# Patient Record
Sex: FEMALE | Race: WHITE | NOT HISPANIC OR LATINO | URBAN - METROPOLITAN AREA
[De-identification: names, ages, dates, MRNs, and addresses within clinical notes are randomized per-mention and may not be internally consistent; named-entity substitution may affect disease eponyms.]

---

## 2017-01-05 ENCOUNTER — INPATIENT (INPATIENT)
Facility: HOSPITAL | Age: 82
LOS: 5 days | Discharge: NURSING, REHAB OR LONG-TERM CARE | End: 2017-01-11
Attending: INTERNAL MEDICINE | Admitting: INTERNAL MEDICINE
Payer: MEDICARE

## 2017-01-05 DIAGNOSIS — I50.9 HEART FAILURE, UNSPECIFIED: ICD-10-CM

## 2017-01-05 DIAGNOSIS — J18.9 PNEUMONIA, UNSPECIFIED ORGANISM: ICD-10-CM

## 2017-01-05 PROCEDURE — 71010: CPT | Mod: 26

## 2017-01-05 PROCEDURE — 93306 TTE W/DOPPLER COMPLETE: CPT | Mod: 26

## 2017-01-05 PROCEDURE — 99285 EMERGENCY DEPT VISIT HI MDM: CPT

## 2017-01-05 PROCEDURE — 93010 ELECTROCARDIOGRAM REPORT: CPT

## 2017-01-06 PROBLEM — Z00.00 ENCOUNTER FOR PREVENTIVE HEALTH EXAMINATION: Status: ACTIVE | Noted: 2017-01-06

## 2017-01-06 PROCEDURE — 99223 1ST HOSP IP/OBS HIGH 75: CPT

## 2017-01-06 PROCEDURE — 93010 ELECTROCARDIOGRAM REPORT: CPT

## 2017-01-07 PROCEDURE — 93010 ELECTROCARDIOGRAM REPORT: CPT

## 2017-01-07 PROCEDURE — 99233 SBSQ HOSP IP/OBS HIGH 50: CPT

## 2017-01-08 PROCEDURE — 99232 SBSQ HOSP IP/OBS MODERATE 35: CPT

## 2017-01-08 PROCEDURE — 93010 ELECTROCARDIOGRAM REPORT: CPT

## 2017-01-09 PROCEDURE — 99232 SBSQ HOSP IP/OBS MODERATE 35: CPT

## 2017-01-09 PROCEDURE — 93308 TTE F-UP OR LMTD: CPT | Mod: 26

## 2017-01-10 PROCEDURE — 99232 SBSQ HOSP IP/OBS MODERATE 35: CPT

## 2017-01-11 PROCEDURE — 93306 TTE W/DOPPLER COMPLETE: CPT | Mod: 26

## 2017-01-17 DIAGNOSIS — M19.90 UNSPECIFIED OSTEOARTHRITIS, UNSPECIFIED SITE: ICD-10-CM

## 2017-01-17 DIAGNOSIS — I50.31 ACUTE DIASTOLIC (CONGESTIVE) HEART FAILURE: ICD-10-CM

## 2017-01-17 DIAGNOSIS — I47.1 SUPRAVENTRICULAR TACHYCARDIA: ICD-10-CM

## 2017-01-17 DIAGNOSIS — J12.9 VIRAL PNEUMONIA, UNSPECIFIED: ICD-10-CM

## 2017-01-17 DIAGNOSIS — Z85.3 PERSONAL HISTORY OF MALIGNANT NEOPLASM OF BREAST: ICD-10-CM

## 2017-01-17 DIAGNOSIS — Z72.0 TOBACCO USE: ICD-10-CM

## 2017-01-17 DIAGNOSIS — I11.0 HYPERTENSIVE HEART DISEASE WITH HEART FAILURE: ICD-10-CM

## 2017-01-17 DIAGNOSIS — B97.4 RESPIRATORY SYNCYTIAL VIRUS AS THE CAUSE OF DISEASES CLASSIFIED ELSEWHERE: ICD-10-CM

## 2017-01-17 DIAGNOSIS — I21.4 NON-ST ELEVATION (NSTEMI) MYOCARDIAL INFARCTION: ICD-10-CM

## 2017-01-19 DIAGNOSIS — I24.8 OTHER FORMS OF ACUTE ISCHEMIC HEART DISEASE: ICD-10-CM

## 2017-03-21 ENCOUNTER — INPATIENT (INPATIENT)
Facility: HOSPITAL | Age: 82
LOS: 5 days | Discharge: HOME HEALTH | End: 2017-03-27
Attending: FAMILY MEDICINE | Admitting: INTERNAL MEDICINE
Payer: MEDICARE

## 2017-03-21 VITALS — WEIGHT: 106.92 LBS

## 2017-03-21 LAB
ALBUMIN SERPL ELPH-MCNC: 3.1 G/DL — LOW (ref 3.3–5)
ALP SERPL-CCNC: 90 U/L — SIGNIFICANT CHANGE UP (ref 40–120)
ALT FLD-CCNC: 13 U/L — SIGNIFICANT CHANGE UP (ref 12–78)
ANION GAP SERPL CALC-SCNC: 9 MMOL/L — SIGNIFICANT CHANGE UP (ref 5–17)
APTT BLD: 31.5 SEC — SIGNIFICANT CHANGE UP (ref 27.5–37.4)
AST SERPL-CCNC: 33 U/L — SIGNIFICANT CHANGE UP (ref 15–37)
BASOPHILS # BLD AUTO: 0.1 K/UL — SIGNIFICANT CHANGE UP (ref 0–0.2)
BASOPHILS NFR BLD AUTO: 0.6 % — SIGNIFICANT CHANGE UP (ref 0–2)
BILIRUB SERPL-MCNC: 0.4 MG/DL — SIGNIFICANT CHANGE UP (ref 0.2–1.2)
BUN SERPL-MCNC: 37 MG/DL — HIGH (ref 7–23)
CALCIUM SERPL-MCNC: 9 MG/DL — SIGNIFICANT CHANGE UP (ref 8.5–10.1)
CHLORIDE SERPL-SCNC: 109 MMOL/L — HIGH (ref 96–108)
CK SERPL-CCNC: 76 U/L — SIGNIFICANT CHANGE UP (ref 26–192)
CO2 SERPL-SCNC: 26 MMOL/L — SIGNIFICANT CHANGE UP (ref 22–31)
CREAT SERPL-MCNC: 1.27 MG/DL — SIGNIFICANT CHANGE UP (ref 0.5–1.3)
D DIMER BLD IA.RAPID-MCNC: 716 NG/ML DDU — HIGH
EOSINOPHIL # BLD AUTO: 0.1 K/UL — SIGNIFICANT CHANGE UP (ref 0–0.5)
EOSINOPHIL NFR BLD AUTO: 1.2 % — SIGNIFICANT CHANGE UP (ref 0–6)
GLUCOSE SERPL-MCNC: 75 MG/DL — SIGNIFICANT CHANGE UP (ref 70–99)
HCT VFR BLD CALC: 37.9 % — SIGNIFICANT CHANGE UP (ref 34.5–45)
HGB BLD-MCNC: 11.6 G/DL — SIGNIFICANT CHANGE UP (ref 11.5–15.5)
INR BLD: 0.94 RATIO — SIGNIFICANT CHANGE UP (ref 0.88–1.16)
LYMPHOCYTES # BLD AUTO: 1.5 K/UL — SIGNIFICANT CHANGE UP (ref 1–3.3)
LYMPHOCYTES # BLD AUTO: 12.5 % — LOW (ref 13–44)
MCHC RBC-ENTMCNC: 24.1 PG — LOW (ref 27–34)
MCHC RBC-ENTMCNC: 30.7 GM/DL — LOW (ref 32–36)
MCV RBC AUTO: 78.4 FL — LOW (ref 80–100)
MONOCYTES # BLD AUTO: 0.9 K/UL — SIGNIFICANT CHANGE UP (ref 0–0.9)
MONOCYTES NFR BLD AUTO: 7.4 % — SIGNIFICANT CHANGE UP (ref 2–14)
NEUTROPHILS # BLD AUTO: 9.5 K/UL — HIGH (ref 1.8–7.4)
NEUTROPHILS NFR BLD AUTO: 78.3 % — HIGH (ref 43–77)
PLATELET # BLD AUTO: 464 K/UL — HIGH (ref 150–400)
POTASSIUM SERPL-MCNC: 5.1 MMOL/L — SIGNIFICANT CHANGE UP (ref 3.5–5.3)
POTASSIUM SERPL-SCNC: 5.1 MMOL/L — SIGNIFICANT CHANGE UP (ref 3.5–5.3)
PROT SERPL-MCNC: 7.5 GM/DL — SIGNIFICANT CHANGE UP (ref 6–8.3)
PROTHROM AB SERPL-ACNC: 10.1 SEC — SIGNIFICANT CHANGE UP (ref 9.8–12.7)
RBC # BLD: 4.83 M/UL — SIGNIFICANT CHANGE UP (ref 3.8–5.2)
RBC # FLD: 16.9 % — HIGH (ref 10.3–14.5)
SODIUM SERPL-SCNC: 144 MMOL/L — SIGNIFICANT CHANGE UP (ref 135–145)
TROPONIN I SERPL-MCNC: 0.04 NG/ML — SIGNIFICANT CHANGE UP (ref 0.01–0.04)
TROPONIN I SERPL-MCNC: 0.05 NG/ML — HIGH (ref 0.01–0.04)
WBC # BLD: 12.2 K/UL — HIGH (ref 3.8–10.5)
WBC # FLD AUTO: 12.2 K/UL — HIGH (ref 3.8–10.5)

## 2017-03-21 PROCEDURE — 99291 CRITICAL CARE FIRST HOUR: CPT

## 2017-03-21 PROCEDURE — 93010 ELECTROCARDIOGRAM REPORT: CPT

## 2017-03-21 PROCEDURE — 71010: CPT | Mod: 26

## 2017-03-21 RX ORDER — NITROGLYCERIN 6.5 MG
1 CAPSULE, EXTENDED RELEASE ORAL ONCE
Qty: 0 | Refills: 0 | Status: COMPLETED | OUTPATIENT
Start: 2017-03-21 | End: 2017-03-21

## 2017-03-21 RX ORDER — FUROSEMIDE 40 MG
60 TABLET ORAL ONCE
Qty: 0 | Refills: 0 | Status: COMPLETED | OUTPATIENT
Start: 2017-03-21 | End: 2017-03-21

## 2017-03-21 RX ORDER — ASPIRIN/CALCIUM CARB/MAGNESIUM 324 MG
81 TABLET ORAL DAILY
Qty: 0 | Refills: 0 | Status: DISCONTINUED | OUTPATIENT
Start: 2017-03-21 | End: 2017-03-27

## 2017-03-21 RX ORDER — SODIUM CHLORIDE 9 MG/ML
3 INJECTION INTRAMUSCULAR; INTRAVENOUS; SUBCUTANEOUS ONCE
Qty: 0 | Refills: 0 | Status: COMPLETED | OUTPATIENT
Start: 2017-03-21 | End: 2017-03-21

## 2017-03-21 RX ADMIN — SODIUM CHLORIDE 3 MILLILITER(S): 9 INJECTION INTRAMUSCULAR; INTRAVENOUS; SUBCUTANEOUS at 19:24

## 2017-03-21 RX ADMIN — Medication 81 MILLIGRAM(S): at 20:50

## 2017-03-21 RX ADMIN — Medication 1 INCH(S): at 19:24

## 2017-03-21 RX ADMIN — Medication 60 MILLIGRAM(S): at 19:24

## 2017-03-21 NOTE — ED ADULT NURSE NOTE - ED STAT RN HANDOFF DETAILS
Recvd care of pt at this time from RN Chloe VAIL, pt alert, confused, as per daughter pt is at baseline mental status, pt on NRB, RR 20, pt on cardiac monitor, states relief in breathing after medication and placement on NRB, bed rails up, call bell within reach, daughter at bedside, will continue to monitor.

## 2017-03-21 NOTE — ED ADULT NURSE NOTE - OBJECTIVE STATEMENT
Pt is a 89 y/o female, A & O x 3, presents to ED w/ SOB w/ excertion , began earlier today, pt also c/o chest pain, but has since resolved at this time, pt placed on Venti mask, sp02 was in 80's, improved to 99%, pt denies nausea, vomiting or diarrhea, pt on cardiac monitor, EKG obtained, IV lock placed, bed rails up, will continue to monitor.

## 2017-03-21 NOTE — H&P ADULT - ASSESSMENT
89 yo F with PMH significant for HTN, Systolic CHF resident of [facility] sent to the ED for eval. of SOB. As per patient, this morning she woke up with worsening of SOB.    # Dyspnea: 2/2 to acute on chronic systolic CHF, r/o ACS, r/o PE  - Admitted to telemetry  - Serial CE  - serial EKG  - Pt received IV Lasix 60 mg x 1 in the ED and started ot feel better  - c/w Lasix  - c/w Aspirin  - Monitro vitals    # 89 yo F with PMH significant for HTN, Systolic CHF resident of [facility] sent to the ED for eval. of SOB. As per patient, this morning she woke up with worsening of SOB.

## 2017-03-21 NOTE — ED PROVIDER NOTE - ENMT, MLM
Airway patent, Nasal mucosa clear. Mouth with normal mucosa. Throat has no vesicles, no oropharyngeal exudates and uvula is midline. + JVD.

## 2017-03-21 NOTE — H&P ADULT - HISTORY OF PRESENT ILLNESS
89 yo F with PMH significant for HTN, Systolic CHF resident of [facility] sent to the ED for eval. of SOB. As per patient, this morning she woke up with worsening of SOB. Pt states that for the past few days she feels exertional SOB that is progressively gotten worse today. Also c/o chest pain. Pt states that this morning she also noticed change in her speech describes as slurred speech. Denies tingling or numbness. Denies acute wekaness on extremeties . Denies cough or palpitations. Denies abdominal pain, N/V/D/C. Denies fever or chills.    On arrival to the ED,  pt was desating to 70-80 % on RA, was placed on Ventimask and now gradually weaned on nasal canula, sating 100 % on 3 LNC.     In the ED:  D DImer: 716  Troponin : 0.052 >> o.036 89 yo F with PMH significant for HTN, Systolic CHF resident of [facility] sent to the ED for eval. of SOB. As per patient, this morning she woke up with worsening of SOB. Pt states that for the past few days she feels exertional SOB that is progressively gotten worse today. Also c/o chest pain. Pt states that this morning she also noticed change in her speech describes as slurred speech. Denies tingling or numbness. Denies acute weakness on extremeties . Denies cough or palpitations. Denies abdominal pain, N/V/D/C. Denies fever or chills. Denies chest pain at present.     On arrival to the ED,  pt was desating to 70-80 % on RA, was placed on Ventimask and now gradually weaned on nasal canula, sating 100 % on 3 LNC.     In the ED:  D DImer: 716  Troponin : 0.052 >> o.036  CXR: b/l [pleural effusion, waiting for official reading

## 2017-03-21 NOTE — ED PROVIDER NOTE - OBJECTIVE STATEMENT
89 y/o female with hx of Pericardial effusion, CHF presents to the ED for shortness of breath with onset in the past few hours since this morning after waking up. Fell one week ago and had knee and nose injury. Denies fever, cough, urinary sx, abd pain, PMD is Dr. Steen. 89 y/o female with hx of Pericardial effusion, CHF presents to the ED for shortness of breath with onset in the past few hours since this morning after waking up. C/o shortness of breath.  Fell one week ago and had knee and nose injury. Denies fever, cough, urinary sx, abd pain, chest pain. PMD is Dr. Steen.

## 2017-03-21 NOTE — ED ADULT NURSE REASSESSMENT NOTE - CONDITION
improved/At 20:30, pt taken off NRB, placed on Venti mask 50%, VSS, resp easy & non labored, will continue to monitor.

## 2017-03-21 NOTE — ED PROVIDER NOTE - CRITICAL CARE PROVIDED
interpretation of diagnostic studies/direct patient care (not related to procedure)/consult w/ pt's family directly relating to pts condition/documentation

## 2017-03-21 NOTE — ED PROVIDER NOTE - NS ED MD SCRIBE ATTENDING SCRIBE SECTIONS
PAST MEDICAL/SURGICAL/SOCIAL HISTORY/VITAL SIGNS( Pullset)/HISTORY OF PRESENT ILLNESS/REVIEW OF SYSTEMS/PROGRESS NOTE/PHYSICAL EXAM/RESULTS/DISPOSITION

## 2017-03-21 NOTE — H&P ADULT - ATTENDING COMMENTS
I have examined the patient and confirmed the essential components of the history, physical examination, diagnosis, and treatment plan. I agree with the patient's care as documented by the resident and amended herein by me. See resident's note for complete details of service

## 2017-03-21 NOTE — H&P ADULT - PROBLEM SELECTOR PLAN 1
(4) no impairment # Dyspnea: 2/2 to acute and chronic systolic CHF   Admitted to telemetry  - Serial CE  - serial EKG  - Pt received IV Lasix 60 mg x 1 in the ED and started ot feel better  - c/w Lasix  - c/w Aspirin  - Monitor vitals  - CTA chest PE to r/o PE    # # Dyspnea: 2/2 to acute and chronic systolic CHF   Admitted to telemetry  - Serial CE  - serial EKG  - Pt received IV Lasix 60 mg x 1 in the ED and started ot feel better  - c/w Lasix  - c/w Aspirin  - Monitor vitals  - CTA chest PE to r/o PE  -  Last Echo from 01/2017: LVEF 55 %, trace AR and MR, Moderate pericardial effusion.    # # Dyspnea: 2/2 to acute on chronic systolic CHF (HFrEF)   Admitted to telemetry  - Serial CE  - serial EKG  - Pt received IV Lasix 60 mg x 1 in the ED and started ot feel better  - c/w Lasix  - c/w Aspirin  - Monitor vitals  - Last Echo from 01/2017: LVEF 55 %, trace AR and MR, Moderate pericardial effusion.  - Dyspnea less likely due to PE --> CTA not indicated as patient's modified Well's is = 4.0.

## 2017-03-21 NOTE — H&P ADULT - NSHPPHYSICALEXAM_GEN_ALL_CORE
Vital Signs Last 24 Hrs  T(C): 36.4, Max: 36.4 (03-21 @ 19:25)  T(F): 97.5, Max: 97.6 (03-21 @ 19:25)  HR: 70 (69 - 76)  BP: 140/61 (140/61 - 190/90)  BP(mean): --  RR: 18 (16 - 19)  SpO2: 100% (98% - 100%)

## 2017-03-21 NOTE — ED PROVIDER NOTE - PMH
Congestive heart failure, unspecified congestive heart failure chronicity, unspecified congestive heart failure type

## 2017-03-21 NOTE — ED PROVIDER NOTE - PROGRESS NOTE DETAILS
Jaden Almanza, on behalf of Attending Dr. Gomez, Patient re-assessed and states she is feeling improved. Per daughter meds are ramipril 2.5mg daily, lasix 20 mg every other day [id number] meq/daily, mvi

## 2017-03-22 DIAGNOSIS — D72.829 ELEVATED WHITE BLOOD CELL COUNT, UNSPECIFIED: ICD-10-CM

## 2017-03-22 DIAGNOSIS — I10 ESSENTIAL (PRIMARY) HYPERTENSION: ICD-10-CM

## 2017-03-22 DIAGNOSIS — K59.00 CONSTIPATION, UNSPECIFIED: ICD-10-CM

## 2017-03-22 DIAGNOSIS — Z29.9 ENCOUNTER FOR PROPHYLACTIC MEASURES, UNSPECIFIED: ICD-10-CM

## 2017-03-22 DIAGNOSIS — I50.23 ACUTE ON CHRONIC SYSTOLIC (CONGESTIVE) HEART FAILURE: ICD-10-CM

## 2017-03-22 LAB
ANION GAP SERPL CALC-SCNC: 9 MMOL/L — SIGNIFICANT CHANGE UP (ref 5–17)
APTT BLD: 29.6 SEC — SIGNIFICANT CHANGE UP (ref 27.5–37.4)
BASOPHILS # BLD AUTO: 0 K/UL — SIGNIFICANT CHANGE UP (ref 0–0.2)
BASOPHILS NFR BLD AUTO: 0.6 % — SIGNIFICANT CHANGE UP (ref 0–2)
BUN SERPL-MCNC: 41 MG/DL — HIGH (ref 7–23)
CALCIUM SERPL-MCNC: 8.7 MG/DL — SIGNIFICANT CHANGE UP (ref 8.5–10.1)
CHLORIDE SERPL-SCNC: 109 MMOL/L — HIGH (ref 96–108)
CHOLEST SERPL-MCNC: 164 MG/DL — SIGNIFICANT CHANGE UP (ref 10–199)
CO2 SERPL-SCNC: 27 MMOL/L — SIGNIFICANT CHANGE UP (ref 22–31)
CREAT SERPL-MCNC: 1.21 MG/DL — SIGNIFICANT CHANGE UP (ref 0.5–1.3)
EOSINOPHIL # BLD AUTO: 0.2 K/UL — SIGNIFICANT CHANGE UP (ref 0–0.5)
EOSINOPHIL NFR BLD AUTO: 1.8 % — SIGNIFICANT CHANGE UP (ref 0–6)
GLUCOSE SERPL-MCNC: 84 MG/DL — SIGNIFICANT CHANGE UP (ref 70–99)
HCT VFR BLD CALC: 35.8 % — SIGNIFICANT CHANGE UP (ref 34.5–45)
HDLC SERPL-MCNC: 66 MG/DL — SIGNIFICANT CHANGE UP (ref 40–125)
HGB BLD-MCNC: 11.2 G/DL — LOW (ref 11.5–15.5)
INR BLD: 0.95 RATIO — SIGNIFICANT CHANGE UP (ref 0.88–1.16)
LACTATE SERPL-SCNC: 1 MMOL/L — SIGNIFICANT CHANGE UP (ref 0.7–2)
LIPID PNL WITH DIRECT LDL SERPL: 79 MG/DL — SIGNIFICANT CHANGE UP
LYMPHOCYTES # BLD AUTO: 1.4 K/UL — SIGNIFICANT CHANGE UP (ref 1–3.3)
LYMPHOCYTES # BLD AUTO: 16.8 % — SIGNIFICANT CHANGE UP (ref 13–44)
MAGNESIUM SERPL-MCNC: 2.3 MG/DL — SIGNIFICANT CHANGE UP (ref 1.8–2.4)
MCHC RBC-ENTMCNC: 24.5 PG — LOW (ref 27–34)
MCHC RBC-ENTMCNC: 31.2 GM/DL — LOW (ref 32–36)
MCV RBC AUTO: 78.6 FL — LOW (ref 80–100)
MONOCYTES # BLD AUTO: 0.8 K/UL — SIGNIFICANT CHANGE UP (ref 0–0.9)
MONOCYTES NFR BLD AUTO: 9.6 % — SIGNIFICANT CHANGE UP (ref 2–14)
NEUTROPHILS # BLD AUTO: 6 K/UL — SIGNIFICANT CHANGE UP (ref 1.8–7.4)
NEUTROPHILS NFR BLD AUTO: 71.3 % — SIGNIFICANT CHANGE UP (ref 43–77)
NT-PROBNP SERPL-SCNC: 3939 PG/ML — HIGH (ref 0–450)
PLATELET # BLD AUTO: 398 K/UL — SIGNIFICANT CHANGE UP (ref 150–400)
POTASSIUM SERPL-MCNC: 4.7 MMOL/L — SIGNIFICANT CHANGE UP (ref 3.5–5.3)
POTASSIUM SERPL-SCNC: 4.7 MMOL/L — SIGNIFICANT CHANGE UP (ref 3.5–5.3)
PROTHROM AB SERPL-ACNC: 10.2 SEC — SIGNIFICANT CHANGE UP (ref 9.8–12.7)
RBC # BLD: 4.56 M/UL — SIGNIFICANT CHANGE UP (ref 3.8–5.2)
RBC # FLD: 16.6 % — HIGH (ref 10.3–14.5)
SODIUM SERPL-SCNC: 145 MMOL/L — SIGNIFICANT CHANGE UP (ref 135–145)
TOTAL CHOLESTEROL/HDL RATIO MEASUREMENT: 2.5 RATIO — LOW (ref 3.3–7.1)
TRIGL SERPL-MCNC: 93 MG/DL — SIGNIFICANT CHANGE UP (ref 10–149)
TSH SERPL-MCNC: 1.92 UIU/ML — SIGNIFICANT CHANGE UP (ref 0.36–3.74)
WBC # BLD: 8.4 K/UL — SIGNIFICANT CHANGE UP (ref 3.8–10.5)
WBC # FLD AUTO: 8.4 K/UL — SIGNIFICANT CHANGE UP (ref 3.8–10.5)

## 2017-03-22 PROCEDURE — 99223 1ST HOSP IP/OBS HIGH 75: CPT

## 2017-03-22 PROCEDURE — 93010 ELECTROCARDIOGRAM REPORT: CPT

## 2017-03-22 RX ORDER — FUROSEMIDE 40 MG
1 TABLET ORAL
Qty: 0 | Refills: 0 | COMMUNITY

## 2017-03-22 RX ORDER — FUROSEMIDE 40 MG
40 TABLET ORAL DAILY
Qty: 0 | Refills: 0 | Status: DISCONTINUED | OUTPATIENT
Start: 2017-03-22 | End: 2017-03-23

## 2017-03-22 RX ORDER — LISINOPRIL 2.5 MG/1
40 TABLET ORAL DAILY
Qty: 0 | Refills: 0 | Status: DISCONTINUED | OUTPATIENT
Start: 2017-03-22 | End: 2017-03-27

## 2017-03-22 RX ORDER — FUROSEMIDE 40 MG
40 TABLET ORAL ONCE
Qty: 0 | Refills: 0 | Status: DISCONTINUED | OUTPATIENT
Start: 2017-03-22 | End: 2017-03-22

## 2017-03-22 RX ORDER — POTASSIUM CHLORIDE 20 MEQ
20 PACKET (EA) ORAL DAILY
Qty: 0 | Refills: 0 | Status: DISCONTINUED | OUTPATIENT
Start: 2017-03-22 | End: 2017-03-25

## 2017-03-22 RX ORDER — ACETAMINOPHEN 500 MG
650 TABLET ORAL EVERY 6 HOURS
Qty: 0 | Refills: 0 | Status: DISCONTINUED | OUTPATIENT
Start: 2017-03-22 | End: 2017-03-27

## 2017-03-22 RX ORDER — FUROSEMIDE 40 MG
40 TABLET ORAL DAILY
Qty: 0 | Refills: 0 | Status: DISCONTINUED | OUTPATIENT
Start: 2017-03-22 | End: 2017-03-22

## 2017-03-22 RX ORDER — DOCUSATE SODIUM 100 MG
100 CAPSULE ORAL THREE TIMES A DAY
Qty: 0 | Refills: 0 | Status: DISCONTINUED | OUTPATIENT
Start: 2017-03-22 | End: 2017-03-27

## 2017-03-22 RX ORDER — PANTOPRAZOLE SODIUM 20 MG/1
40 TABLET, DELAYED RELEASE ORAL
Qty: 0 | Refills: 0 | Status: DISCONTINUED | OUTPATIENT
Start: 2017-03-22 | End: 2017-03-27

## 2017-03-22 RX ORDER — METOPROLOL TARTRATE 50 MG
12.5 TABLET ORAL
Qty: 0 | Refills: 0 | Status: DISCONTINUED | OUTPATIENT
Start: 2017-03-22 | End: 2017-03-25

## 2017-03-22 RX ORDER — RAMIPRIL 5 MG
25 CAPSULE ORAL
Qty: 0 | Refills: 0 | COMMUNITY

## 2017-03-22 RX ORDER — POTASSIUM CHLORIDE 20 MEQ
1 PACKET (EA) ORAL
Qty: 0 | Refills: 0 | COMMUNITY

## 2017-03-22 RX ORDER — SENNA PLUS 8.6 MG/1
2 TABLET ORAL AT BEDTIME
Qty: 0 | Refills: 0 | Status: DISCONTINUED | OUTPATIENT
Start: 2017-03-22 | End: 2017-03-27

## 2017-03-22 RX ADMIN — Medication 1 INCH(S): at 06:44

## 2017-03-22 RX ADMIN — Medication 40 MILLIGRAM(S): at 01:58

## 2017-03-22 RX ADMIN — Medication 100 MILLIGRAM(S): at 06:58

## 2017-03-22 RX ADMIN — Medication 20 MILLIEQUIVALENT(S): at 11:48

## 2017-03-22 RX ADMIN — Medication 100 MILLIGRAM(S): at 22:40

## 2017-03-22 RX ADMIN — PANTOPRAZOLE SODIUM 40 MILLIGRAM(S): 20 TABLET, DELAYED RELEASE ORAL at 06:44

## 2017-03-22 RX ADMIN — LISINOPRIL 40 MILLIGRAM(S): 2.5 TABLET ORAL at 06:44

## 2017-03-22 RX ADMIN — Medication 81 MILLIGRAM(S): at 11:48

## 2017-03-22 RX ADMIN — Medication 12.5 MILLIGRAM(S): at 18:16

## 2017-03-22 NOTE — PHYSICAL THERAPY INITIAL EVALUATION ADULT - CRITERIA FOR SKILLED THERAPEUTIC INTERVENTIONS
rehab potential/risk reduction/prevention/anticipated discharge recommendation/anticipated equipment needs at discharge/functional limitations in following categories/therapy frequency/impairments found/predicted duration of therapy intervention

## 2017-03-22 NOTE — DIETITIAN INITIAL EVALUATION ADULT. - OTHER INFO
Consult for BMI<18. Pt reports wt has been stable (confirmed with documented wt h/x. Pt with mild muscle wasting.

## 2017-03-22 NOTE — CONSULT NOTE ADULT - ASSESSMENT
Essential hypertension: Essential hypertension  Acute on chronic systolic (congestive) heart failure: Acute on chronic systolic (congestive) heart failure    less sob today  monitor ProBNP/Electrlytes  agressive diuresis as ordered  continue ACE inhibitor  Low dose Beta blocker  strict i/o,daily weight  2D echo for EF  Serial CE/EKG    Acute on chronic systolic (congestive) heart failure: Acute on chronic systolic (congestive) heart failure Essential hypertension: Essential hypertension  Acute on chronic systolic (congestive) heart failure: Acute on chronic systolic (congestive) heart failure    less sob today  monitor ProBNP/Electrlytes  agressive diuresis as ordered  continue ACE inhibitor  Low dose Beta blocker  strict i/o,daily weight  2D echo for EF  Serial CE/EKG

## 2017-03-22 NOTE — DIETITIAN INITIAL EVALUATION ADULT. - ORAL INTAKE PTA
good/Pt reports having a good appetite, reports reduced PO during hospital stay when she feels sick.

## 2017-03-22 NOTE — PHYSICAL THERAPY INITIAL EVALUATION ADULT - PERTINENT HX OF CURRENT PROBLEM, REHAB EVAL
SOB, pt desat in ED. trop .052/.036. D-dimer elev-"dyspnea less likely PE-CTA not indicated" per MD note.

## 2017-03-22 NOTE — CONSULT NOTE ADULT - SUBJECTIVE AND OBJECTIVE BOX
PCP:    REQUESTING PHYSICIAN:    REASON FOR CONSULT:    CHIEF COMPLAINT:    HPI:  89 yo F with PMH significant for HTN, Systolic CHF resident of [facility] sent to the ED for eval of SOB.   As per patient, this morning she woke up with worsening of SOB.no Chest pain,mild chest tightness   Pt states that for the past few days she feels exertional SOB that is progressively gotten worse.  Pt states that this morning she also noticed change in her speech describes as slurred speech.   Denies tingling or numbness. Denies acute weakness. Denies cough or palpitations. Denies abdominal pain, N/V/D/C. Denies fever or chills. Denies chest pain at present.     On arrival to the ED,  pt was desating to 70-80 % on RA, was placed on Ventimask and now gradually weaned on nasal canula, sating 100 % on 3 LNC.     In the ED:  Troponin : 0.052 >> o.036  CXR: b/l [pleural effusion, waiting for official reading (21 Mar 2017 23:52)      PAST MEDICAL & SURGICAL HISTORY:  CHF (congestive heart failure)  Essential hypertension  Congestive heart failure, unspecified congestive heart failure chronicity, unspecified congestive heart failure type  No significant past surgical history      Allergies    ibuprofen (Unknown)  Originally Entered as [Unknown] reaction to [sulfur] (Unknown)  sulfa drugs (Unknown        SOCIAL HISTORY:  non smoker  no ETOH abuse    FAMILY HISTORY:  No pertinent family history in first degree relatives      MEDICATIONS:  MEDICATIONS  (STANDING):  aspirin  chewable 81milliGRAM(s) Oral daily  lisinopril 40milliGRAM(s) Oral daily  diltiazem   CD 180milliGRAM(s) Oral daily  potassium chloride    Tablet ER 20milliEquivalent(s) Oral daily  pantoprazole    Tablet 40milliGRAM(s) Oral before breakfast  docusate sodium 100milliGRAM(s) Oral three times a day  furosemide   Injectable 40milliGRAM(s) IV Push daily  furosemide   Injectable 40milliGRAM(s) IV Push once    MEDICATIONS  (PRN):  acetaminophen   Tablet 650milliGRAM(s) Oral every 6 hours PRN pain and fever  aluminum hydroxide/magnesium hydroxide/simethicone Suspension 30milliLiter(s) Oral every 4 hours PRN Dyspepsia  senna 2Tablet(s) Oral at bedtime PRN Constipation      REVIEW OF SYSTEMS:    CONSTITUTIONAL: No weakness, fevers or chills  EYES/ENT: No visual changes;  No vertigo or throat pain   NECK: No pain or stiffness  RESPIRATORY: No cough, wheezing, hemoptysis; No shortness of breath  CARDIOVASCULAR: No chest pain or palpitations  GASTROINTESTINAL: No abdominal or epigastric pain. No nausea, vomiting, or hematemesis; No diarrhea or constipation. No melena or hematochezia.  GENITOURINARY: No dysuria, frequency or hematuria  NEUROLOGICAL: No numbness or weakness  SKIN: No itching, burning, rashes, or lesions   All other review of systems is negative unless indicated above  Vital Signs Last 24 Hrs  T(C): 36.7, Max: 36.9 (03-22 @ 02:34)  T(F): 98, Max: 98.4 (03-22 @ 02:34)  HR: 77 (69 - 77)  BP: 166/73 (140/61 - 190/90)  BP(mean): --  RR: 16 (16 - 19)  SpO2: 99% (96% - 100%)      PHYSICAL EXAM:    Constitutional: NAD, awake and alert, well-developed  HEENT: PERR, EOMI,  No oral cyananosis.  Neck:  supple,  No JVD  Respiratory: Breath sounds are clear bilaterally, No wheezing, rales or rhonchi  Cardiovascular: S1 and S2, regular rate and rhythm, no Murmurs, gallops or rubs  Gastrointestinal: Bowel Sounds present, soft, nontender.   Extremities: No peripheral edema. No clubbing or cyanosis.  Vascular: 2+ peripheral pulses  Neurological: A/O x 3, no focal deficits  Musculoskeletal: no calf tenderness.  Skin: No rashes.      LABS: All Labs Reviewed:                        11.2   8.4   )-----------( 398      ( 22 Mar 2017 05:54 )             35.8                         11.6   12.2  )-----------( 464      ( 21 Mar 2017 19:54 )             37.9     22 Mar 2017 05:54    145    |  109    |  41     ----------------------------<  84     4.7     |  27     |  1.21   21 Mar 2017 18:39    144    |  109    |  37     ----------------------------<  75     5.1     |  26     |  1.27     Ca    8.7        22 Mar 2017 05:54  Ca    9.0        21 Mar 2017 18:39  Mg     2.3       22 Mar 2017 05:54    TPro  7.5    /  Alb  3.1    /  TBili  0.4    /  DBili  x      /  AST  33     /  ALT  13     /  AlkPhos  90     21 Mar 2017 18:39    PT/INR - ( 22 Mar 2017 05:54 )   PT: 10.2 sec;   INR: 0.95 ratio         PTT - ( 22 Mar 2017 05:54 )  PTT:29.6 sec  CARDIAC MARKERS ( 21 Mar 2017 22:32 )  0.036 ng/mL / x     / x     / x     / x      CARDIAC MARKERS ( 21 Mar 2017 18:39 )  0.052 ng/mL / x     / 76 U/L / x     / x          Blood Culture:   03-22 @ 00:30  Pro Bnp [id number]    03-22 @ 05:54  TSH: 1.920      RADIOLOGY        EKG:  NSR,old septal infarct

## 2017-03-23 DIAGNOSIS — I50.9 HEART FAILURE, UNSPECIFIED: ICD-10-CM

## 2017-03-23 DIAGNOSIS — N17.9 ACUTE KIDNEY FAILURE, UNSPECIFIED: ICD-10-CM

## 2017-03-23 LAB
ANION GAP SERPL CALC-SCNC: 8 MMOL/L — SIGNIFICANT CHANGE UP (ref 5–17)
BUN SERPL-MCNC: 50 MG/DL — HIGH (ref 7–23)
CALCIUM SERPL-MCNC: 8.1 MG/DL — LOW (ref 8.5–10.1)
CHLORIDE SERPL-SCNC: 107 MMOL/L — SIGNIFICANT CHANGE UP (ref 96–108)
CO2 SERPL-SCNC: 28 MMOL/L — SIGNIFICANT CHANGE UP (ref 22–31)
CREAT SERPL-MCNC: 1.52 MG/DL — HIGH (ref 0.5–1.3)
GLUCOSE SERPL-MCNC: 91 MG/DL — SIGNIFICANT CHANGE UP (ref 70–99)
POTASSIUM SERPL-MCNC: 5.3 MMOL/L — SIGNIFICANT CHANGE UP (ref 3.5–5.3)
POTASSIUM SERPL-SCNC: 5.3 MMOL/L — SIGNIFICANT CHANGE UP (ref 3.5–5.3)
SODIUM SERPL-SCNC: 143 MMOL/L — SIGNIFICANT CHANGE UP (ref 135–145)

## 2017-03-23 PROCEDURE — 99233 SBSQ HOSP IP/OBS HIGH 50: CPT

## 2017-03-23 PROCEDURE — 93010 ELECTROCARDIOGRAM REPORT: CPT

## 2017-03-23 RX ADMIN — Medication 40 MILLIGRAM(S): at 07:28

## 2017-03-23 RX ADMIN — PANTOPRAZOLE SODIUM 40 MILLIGRAM(S): 20 TABLET, DELAYED RELEASE ORAL at 07:29

## 2017-03-23 RX ADMIN — Medication 100 MILLIGRAM(S): at 07:27

## 2017-03-23 RX ADMIN — LISINOPRIL 40 MILLIGRAM(S): 2.5 TABLET ORAL at 07:32

## 2017-03-23 RX ADMIN — Medication 12.5 MILLIGRAM(S): at 18:06

## 2017-03-23 RX ADMIN — Medication 100 MILLIGRAM(S): at 21:03

## 2017-03-23 RX ADMIN — Medication 20 MILLIEQUIVALENT(S): at 13:09

## 2017-03-23 RX ADMIN — Medication 81 MILLIGRAM(S): at 13:09

## 2017-03-23 NOTE — CDI QUERY NOTE - NSCDIOTHERTXTBX_GEN_ALL_CORE_HH
Patient admitted with complaints of SOB.  Per documentation: On arrival to the ED,  pt was desating to 70-80 % on RA, was placed on Ventimask and now gradually weaned on nasal canula, sating 100 % on 3 LNC.    Presently O2 sats= % but patient requires 4 liters O2 nc continuously     Can the patient's respiratory status be further clarified ?  ie.. Acute respiratory failure, now resolved ?  ..... Acute hypoxic respiratory failure ?  ..... Acute on chronic respiratory failure ?  ..... No clinical evidence of respiratory failure ? Other ? Please clarify

## 2017-03-24 DIAGNOSIS — I31.3 PERICARDIAL EFFUSION (NONINFLAMMATORY): ICD-10-CM

## 2017-03-24 DIAGNOSIS — I50.33 ACUTE ON CHRONIC DIASTOLIC (CONGESTIVE) HEART FAILURE: ICD-10-CM

## 2017-03-24 LAB
ANION GAP SERPL CALC-SCNC: 7 MMOL/L — SIGNIFICANT CHANGE UP (ref 5–17)
BUN SERPL-MCNC: 48 MG/DL — HIGH (ref 7–23)
CALCIUM SERPL-MCNC: 8.4 MG/DL — LOW (ref 8.5–10.1)
CHLORIDE SERPL-SCNC: 108 MMOL/L — SIGNIFICANT CHANGE UP (ref 96–108)
CO2 SERPL-SCNC: 29 MMOL/L — SIGNIFICANT CHANGE UP (ref 22–31)
CREAT SERPL-MCNC: 1.43 MG/DL — HIGH (ref 0.5–1.3)
GLUCOSE SERPL-MCNC: 86 MG/DL — SIGNIFICANT CHANGE UP (ref 70–99)
HCT VFR BLD CALC: 34.4 % — LOW (ref 34.5–45)
HGB BLD-MCNC: 10.2 G/DL — LOW (ref 11.5–15.5)
MCHC RBC-ENTMCNC: 23.6 PG — LOW (ref 27–34)
MCHC RBC-ENTMCNC: 29.7 GM/DL — LOW (ref 32–36)
MCV RBC AUTO: 79.4 FL — LOW (ref 80–100)
PLATELET # BLD AUTO: 361 K/UL — SIGNIFICANT CHANGE UP (ref 150–400)
POTASSIUM SERPL-MCNC: 5.2 MMOL/L — SIGNIFICANT CHANGE UP (ref 3.5–5.3)
POTASSIUM SERPL-SCNC: 5.2 MMOL/L — SIGNIFICANT CHANGE UP (ref 3.5–5.3)
RBC # BLD: 4.34 M/UL — SIGNIFICANT CHANGE UP (ref 3.8–5.2)
RBC # FLD: 16.5 % — HIGH (ref 10.3–14.5)
SODIUM SERPL-SCNC: 144 MMOL/L — SIGNIFICANT CHANGE UP (ref 135–145)
WBC # BLD: 9.4 K/UL — SIGNIFICANT CHANGE UP (ref 3.8–10.5)
WBC # FLD AUTO: 9.4 K/UL — SIGNIFICANT CHANGE UP (ref 3.8–10.5)

## 2017-03-24 PROCEDURE — 93308 TTE F-UP OR LMTD: CPT | Mod: 26

## 2017-03-24 PROCEDURE — 99233 SBSQ HOSP IP/OBS HIGH 50: CPT

## 2017-03-24 RX ORDER — FUROSEMIDE 40 MG
20 TABLET ORAL DAILY
Qty: 0 | Refills: 0 | Status: DISCONTINUED | OUTPATIENT
Start: 2017-03-24 | End: 2017-03-25

## 2017-03-24 RX ORDER — HEPARIN SODIUM 5000 [USP'U]/ML
5000 INJECTION INTRAVENOUS; SUBCUTANEOUS EVERY 12 HOURS
Qty: 0 | Refills: 0 | Status: DISCONTINUED | OUTPATIENT
Start: 2017-03-24 | End: 2017-03-27

## 2017-03-24 RX ADMIN — HEPARIN SODIUM 5000 UNIT(S): 5000 INJECTION INTRAVENOUS; SUBCUTANEOUS at 17:10

## 2017-03-24 RX ADMIN — Medication 100 MILLIGRAM(S): at 05:18

## 2017-03-24 RX ADMIN — Medication 20 MILLIGRAM(S): at 17:10

## 2017-03-24 RX ADMIN — Medication 100 MILLIGRAM(S): at 21:24

## 2017-03-24 RX ADMIN — Medication 100 MILLIGRAM(S): at 13:38

## 2017-03-24 RX ADMIN — Medication 20 MILLIEQUIVALENT(S): at 11:16

## 2017-03-24 RX ADMIN — LISINOPRIL 40 MILLIGRAM(S): 2.5 TABLET ORAL at 05:18

## 2017-03-24 RX ADMIN — Medication 12.5 MILLIGRAM(S): at 05:18

## 2017-03-24 RX ADMIN — PANTOPRAZOLE SODIUM 40 MILLIGRAM(S): 20 TABLET, DELAYED RELEASE ORAL at 05:18

## 2017-03-24 RX ADMIN — Medication 81 MILLIGRAM(S): at 11:16

## 2017-03-24 RX ADMIN — Medication 12.5 MILLIGRAM(S): at 17:10

## 2017-03-25 LAB
ANION GAP SERPL CALC-SCNC: 8 MMOL/L — SIGNIFICANT CHANGE UP (ref 5–17)
BUN SERPL-MCNC: 44 MG/DL — HIGH (ref 7–23)
CALCIUM SERPL-MCNC: 8.6 MG/DL — SIGNIFICANT CHANGE UP (ref 8.5–10.1)
CHLORIDE SERPL-SCNC: 107 MMOL/L — SIGNIFICANT CHANGE UP (ref 96–108)
CO2 SERPL-SCNC: 28 MMOL/L — SIGNIFICANT CHANGE UP (ref 22–31)
CREAT SERPL-MCNC: 1.33 MG/DL — HIGH (ref 0.5–1.3)
GLUCOSE SERPL-MCNC: 88 MG/DL — SIGNIFICANT CHANGE UP (ref 70–99)
POTASSIUM SERPL-MCNC: 5.3 MMOL/L — SIGNIFICANT CHANGE UP (ref 3.5–5.3)
POTASSIUM SERPL-SCNC: 5.3 MMOL/L — SIGNIFICANT CHANGE UP (ref 3.5–5.3)
SODIUM SERPL-SCNC: 143 MMOL/L — SIGNIFICANT CHANGE UP (ref 135–145)

## 2017-03-25 PROCEDURE — 71010: CPT | Mod: 26

## 2017-03-25 PROCEDURE — 99233 SBSQ HOSP IP/OBS HIGH 50: CPT

## 2017-03-25 RX ORDER — FUROSEMIDE 40 MG
20 TABLET ORAL DAILY
Qty: 0 | Refills: 0 | Status: DISCONTINUED | OUTPATIENT
Start: 2017-03-25 | End: 2017-03-27

## 2017-03-25 RX ORDER — FUROSEMIDE 40 MG
20 TABLET ORAL ONCE
Qty: 0 | Refills: 0 | Status: DISCONTINUED | OUTPATIENT
Start: 2017-03-25 | End: 2017-03-25

## 2017-03-25 RX ORDER — CARVEDILOL PHOSPHATE 80 MG/1
6.25 CAPSULE, EXTENDED RELEASE ORAL EVERY 12 HOURS
Qty: 0 | Refills: 0 | Status: DISCONTINUED | OUTPATIENT
Start: 2017-03-25 | End: 2017-03-27

## 2017-03-25 RX ORDER — FUROSEMIDE 40 MG
20 TABLET ORAL ONCE
Qty: 0 | Refills: 0 | Status: COMPLETED | OUTPATIENT
Start: 2017-03-25 | End: 2017-03-25

## 2017-03-25 RX ADMIN — Medication 100 MILLIGRAM(S): at 21:29

## 2017-03-25 RX ADMIN — Medication 81 MILLIGRAM(S): at 14:24

## 2017-03-25 RX ADMIN — HEPARIN SODIUM 5000 UNIT(S): 5000 INJECTION INTRAVENOUS; SUBCUTANEOUS at 05:31

## 2017-03-25 RX ADMIN — CARVEDILOL PHOSPHATE 6.25 MILLIGRAM(S): 80 CAPSULE, EXTENDED RELEASE ORAL at 14:24

## 2017-03-25 RX ADMIN — CARVEDILOL PHOSPHATE 6.25 MILLIGRAM(S): 80 CAPSULE, EXTENDED RELEASE ORAL at 21:29

## 2017-03-25 RX ADMIN — HEPARIN SODIUM 5000 UNIT(S): 5000 INJECTION INTRAVENOUS; SUBCUTANEOUS at 17:15

## 2017-03-25 RX ADMIN — Medication 0.1 MILLIGRAM(S): at 17:00

## 2017-03-25 RX ADMIN — Medication 100 MILLIGRAM(S): at 05:31

## 2017-03-25 RX ADMIN — Medication 20 MILLIGRAM(S): at 05:29

## 2017-03-25 RX ADMIN — Medication 20 MILLIEQUIVALENT(S): at 11:20

## 2017-03-25 RX ADMIN — PANTOPRAZOLE SODIUM 40 MILLIGRAM(S): 20 TABLET, DELAYED RELEASE ORAL at 06:56

## 2017-03-25 RX ADMIN — LISINOPRIL 40 MILLIGRAM(S): 2.5 TABLET ORAL at 05:33

## 2017-03-25 RX ADMIN — Medication 12.5 MILLIGRAM(S): at 05:33

## 2017-03-25 RX ADMIN — Medication 100 MILLIGRAM(S): at 14:26

## 2017-03-25 NOTE — DISCHARGE NOTE ADULT - MEDICATION SUMMARY - MEDICATIONS TO TAKE
I will START or STAY ON the medications listed below when I get home from the hospital:    ramipril  -- 2.5 milligram(s) by mouth once a day (before a meal)  -- Indication: For HTN (hypertension)    Lasix 40 mg oral tablet  -- 1 tab(s) by mouth every other day  -- Indication: For CHF (congestive heart failure)    KCl-20  -- 1 tab(s) by mouth every other day  -- Indication: For supplement    Protonix 40 mg oral delayed release tablet  -- 1 tab(s) by mouth once a day  -- Indication: For GERD I will START or STAY ON the medications listed below when I get home from the hospital:    Altace 10 mg oral capsule  -- 1 cap(s) by mouth once a day  -- Do not take this drug if you are pregnant.  It is very important that you take or use this exactly as directed.  Do not skip doses or discontinue unless directed by your doctor.  Some non-prescription drugs may aggravate your condition.  Read all labels carefully.  If a warning appears, check with your doctor before taking.    -- Indication: For HTN    carvedilol 6.25 mg oral tablet  -- 1 tab(s) by mouth every 12 hours  -- Indication: For HTN    Lasix 40 mg oral tablet  -- 1 tab(s) by mouth every other day  -- Indication: For CHF (congestive heart failure)    KCl-20  -- 1 tab(s) by mouth every other day  -- Indication: For supplement    Protonix 40 mg oral delayed release tablet  -- 1 tab(s) by mouth once a day  -- Indication: For GERD

## 2017-03-25 NOTE — DISCHARGE NOTE ADULT - HOSPITAL COURSE
· Subjective and Objective: 	  Patient is a 89y old  Female who presents with a chief complaint of c/o SOB (21 Mar 2017 23:52)      SUBJECTIVE:   HPI:  89 yo F with PMH significant for HTN, Systolic CHF resident of [facility] sent to the dyspnea. Noted to be in pulm edema. Today better with IV diuresis. Still requires 4L O2.     03/23/17: Patient seen and examined. Feels better today. SOB improving. Discussed with daughter and son at bed side regarding management and d/c plan.   03/24/17: Patient seen and examined. SOB improving. No new complaints. Discussed with patient, daughter and son at bed side regarding management and d/c plan.  03/25/17: Patient seen and examined. No new complaints.   Vital Signs Last 24 Hrs  T(C): 36.7, Max: 36.8 (03-24 @ 10:25)  T(F): 98.1, Max: 98.3 (03-24 @ 10:25)  HR: 68 (62 - 71)  BP: 198/61 (161/64 - 205/76)  BP(mean): --  RR: 18 (17 - 18)  SpO2: 95% (94% - 100%)        PHYSICAL EXAM:    Constitutional: NAD, awake and alert  HEENT: PERR, EOMI, Normal Hearing, MMM  Neck: Soft and supple, No LAD, No JVD  Respiratory: no rales or rhonchi  Cardiovascular: S1 and S2, regular rate and rhythm, no Murmurs, gallops or rubs  Gastrointestinal: Bowel Sounds present, soft, nontender, nondistended, no guarding, no rebound  Extremities: No peripheral edema  Vascular: 2+ peripheral pulses  Neurological: A/O x 3, no focal deficits  Musculoskeletal: 4/5 strength b/l upper and lower extremities  Skin: No rashes          Assessment and Plan:   · Assessment		  89 yo F with PMH significant for HTN, Systolic CHF resident of [facility] sent to the ED for eval. of SOB. As per patient, this morning she woke up with worsening of SOB.      Problem/Plan - 1:  ·  Problem: Acute on chronic diastolic heart failure.     Acute hypoxic resp failure due to acute CHF: resolved  Continue po lasix  Cardiology follow up appreciated  Echo: normal EF  daily weight  Fluid restrictions.      Problem/Plan - 2:  ·  Problem: Leukocytosis, unspecified type.    resolved.     Problem/Plan - 3:  ·  Problem: Essential hypertension.    Stable  Continue ramipril    Problem/Plan - 4:  ·  Problem: Constipation.  Plan: c/w colace and senna.   Problem/Plan - 5:    Problem: YUNIOR (acute kidney injury). Plan: due to IV lasix, resolved    D/C planning-back to assisted living. Spent more than 30 minutes to prepare the discharge. · Subjective and Objective: 	  Patient is a 89y old  Female who presents with a chief complaint of c/o SOB (21 Mar 2017 23:52)      SUBJECTIVE:   HPI:  89 yo F with PMH significant for HTN, Systolic CHF resident of [facility] sent to the dyspnea. Noted to be in pulm edema. Today better with IV diuresis. Still requires 4L O2.     Patient seen and examined. Feels much better today. No new complaints. Discussed with patient and son at bed side regarding d/c plan.     Vital Signs Last 24 Hrs  T(C): 36.7, Max: 36.8 (03-26 @ 16:34)  T(F): 98.1, Max: 98.3 (03-26 @ 20:55)  HR: 66 (56 - 70)  BP: 144/42 (141/57 - 187/58)  BP(mean): --  RR: 17 (17 - 17)  SpO2: 100% (96% - 100%)        PHYSICAL EXAM:    Constitutional: NAD, awake and alert  HEENT: PERR, EOMI, Normal Hearing, MMM  Neck: Soft and supple, No LAD, No JVD  Respiratory: no rales or rhonchi  Cardiovascular: S1 and S2, regular rate and rhythm, no Murmurs, gallops or rubs  Gastrointestinal: Bowel Sounds present, soft, nontender, nondistended, no guarding, no rebound  Extremities: No peripheral edema  Vascular: 2+ peripheral pulses  Neurological: A/O x 3, no focal deficits  Musculoskeletal: 4/5 strength b/l upper and lower extremities  Skin: No rashes          Assessment and Plan:   · Assessment		  89 yo F with PMH significant for HTN, Systolic CHF resident of [facility] sent to the ED for eval. of SOB. As per patient, this morning she woke up with worsening of SOB.      Problem/Plan - 1:  ·  Problem: Acute on chronic diastolic heart failure.     Acute hypoxic resp failure due to acute CHF: resolved  Continue po lasix as an outpatient  Cardiology follow up appreciated  Echo: normal EF  daily weight  Fluid restrictions.      Problem/Plan - 2:  ·  Problem: Leukocytosis, unspecified type.    resolved.     Problem/Plan - 3:  ·  Problem: Essential hypertension.    Stable  Continue ramipril and coreg    Problem/Plan - 4:  ·  Problem: Constipation.  Plan: c/w colace and senna.   Problem/Plan - 5:    Problem: YUNIOR (acute kidney injury).  due to IV lasix, resolved    D/C planning-back to assisted living. Spent more than 30 minutes to prepare the discharge.

## 2017-03-25 NOTE — DISCHARGE NOTE ADULT - CARE PROVIDER_API CALL
Blas Jara (MD), Cardiovascular Disease  [address] [place] [address]  Phone: [phone]  Fax: [phone]

## 2017-03-25 NOTE — DISCHARGE NOTE ADULT - PATIENT PORTAL LINK FT
“You can access the [facility] Patient Portal, offered by [facility], by registering with the following website: [email or web address]”

## 2017-03-25 NOTE — CHART NOTE - NSCHARTNOTEFT_GEN_A_CORE
Notified by RN that the patient having acute onset of shortness of breath and difficulty in talking.     89 yo F with PMH significant for HTN, Systolic CHF resident of [facility] sent to the ED for eval. of SOB. As per patient, this morning she woke up with worsening of SOB given IV Lasix in the ER currently on 20 mg of oral lasix.      On examination: Pt seen in sitting positing in apparent distress and trying to catch her breath.  CVS:s1s2+  Resp:bilteral crackles+  Abd: soft and nontender  Extr:no edema    Vitals:Vital Signs Last 24 Hrs  T(C): 36.7, Max: 36.8 (03-24 @ 10:25)  T(F): 98.1, Max: 98.3 (03-24 @ 10:25)  HR: 68 (62 - 71)  BP: 205/76 (161/64 - 205/76)  BP(mean): --  RR: 18 (17 - 18)  SpO2: 95% (94% - 100%)      #Acute onset of shortness of breath-Secondary to CHF exacerbation?PND  -CXR-worsening pleural effusion on the L side my reading please follow up the final report.  -Will give Lasix 20 mg IV once as per the patient she felt better after the medication  -Later when I went and spoke to her he said she had pain with deep breathing may be pleurisy unlikely PE well score<4  -Follow up the AM labs     D/W with Dr Giraldo-PGY-3

## 2017-03-25 NOTE — DISCHARGE NOTE ADULT - CARE PLAN
Principal Discharge DX:	Acute on chronic congestive heart failure, unspecified congestive heart failure type  Goal:	Continue po lasix  Instructions for follow-up, activity and diet:	Activity as tolerated. Diet 2 gm Na  Secondary Diagnosis:	Essential hypertension  Goal:	Continue ramipril Principal Discharge DX:	Acute on chronic congestive heart failure, unspecified congestive heart failure type  Goal:	Continue po lasix  Instructions for follow-up, activity and diet:	Activity as tolerated. Diet 2 gm Na  Secondary Diagnosis:	Essential hypertension  Goal:	Continue ramipril and coreg

## 2017-03-25 NOTE — DISCHARGE NOTE ADULT - MEDICATION SUMMARY - MEDICATIONS TO STOP TAKING
I will STOP taking the medications listed below when I get home from the hospital:  None I will STOP taking the medications listed below when I get home from the hospital:    ramipril  -- 25 milligram(s) by mouth once a day (before a meal)

## 2017-03-26 LAB
ANION GAP SERPL CALC-SCNC: 8 MMOL/L — SIGNIFICANT CHANGE UP (ref 5–17)
BUN SERPL-MCNC: 50 MG/DL — HIGH (ref 7–23)
CALCIUM SERPL-MCNC: 8.4 MG/DL — LOW (ref 8.5–10.1)
CHLORIDE SERPL-SCNC: 107 MMOL/L — SIGNIFICANT CHANGE UP (ref 96–108)
CO2 SERPL-SCNC: 28 MMOL/L — SIGNIFICANT CHANGE UP (ref 22–31)
CREAT SERPL-MCNC: 1.37 MG/DL — HIGH (ref 0.5–1.3)
GLUCOSE SERPL-MCNC: 82 MG/DL — SIGNIFICANT CHANGE UP (ref 70–99)
HCT VFR BLD CALC: 32.3 % — LOW (ref 34.5–45)
HGB BLD-MCNC: 9.9 G/DL — LOW (ref 11.5–15.5)
MCHC RBC-ENTMCNC: 23.9 PG — LOW (ref 27–34)
MCHC RBC-ENTMCNC: 30.6 GM/DL — LOW (ref 32–36)
MCV RBC AUTO: 78.3 FL — LOW (ref 80–100)
PLATELET # BLD AUTO: 310 K/UL — SIGNIFICANT CHANGE UP (ref 150–400)
POTASSIUM SERPL-MCNC: 5.4 MMOL/L — HIGH (ref 3.5–5.3)
POTASSIUM SERPL-SCNC: 5.4 MMOL/L — HIGH (ref 3.5–5.3)
RBC # BLD: 4.13 M/UL — SIGNIFICANT CHANGE UP (ref 3.8–5.2)
RBC # FLD: 16.5 % — HIGH (ref 10.3–14.5)
SODIUM SERPL-SCNC: 143 MMOL/L — SIGNIFICANT CHANGE UP (ref 135–145)
WBC # BLD: 5.9 K/UL — SIGNIFICANT CHANGE UP (ref 3.8–10.5)
WBC # FLD AUTO: 5.9 K/UL — SIGNIFICANT CHANGE UP (ref 3.8–10.5)

## 2017-03-26 PROCEDURE — 99233 SBSQ HOSP IP/OBS HIGH 50: CPT

## 2017-03-26 RX ADMIN — CARVEDILOL PHOSPHATE 6.25 MILLIGRAM(S): 80 CAPSULE, EXTENDED RELEASE ORAL at 17:11

## 2017-03-26 RX ADMIN — Medication 100 MILLIGRAM(S): at 06:04

## 2017-03-26 RX ADMIN — HEPARIN SODIUM 5000 UNIT(S): 5000 INJECTION INTRAVENOUS; SUBCUTANEOUS at 06:04

## 2017-03-26 RX ADMIN — Medication 81 MILLIGRAM(S): at 10:55

## 2017-03-26 RX ADMIN — Medication 20 MILLIGRAM(S): at 06:04

## 2017-03-26 RX ADMIN — CARVEDILOL PHOSPHATE 6.25 MILLIGRAM(S): 80 CAPSULE, EXTENDED RELEASE ORAL at 06:04

## 2017-03-26 RX ADMIN — LISINOPRIL 40 MILLIGRAM(S): 2.5 TABLET ORAL at 06:04

## 2017-03-26 RX ADMIN — PANTOPRAZOLE SODIUM 40 MILLIGRAM(S): 20 TABLET, DELAYED RELEASE ORAL at 06:06

## 2017-03-26 NOTE — PROGRESS NOTE ADULT - ASSESSMENT
89 yo F with PMH significant for HTN, Systolic CHF resident of [facility] sent to the ED for eval. of SOB. As per patient, this morning she woke up with worsening of SOB.
89 yo F with PMH significant for HTN, Systolic CHF resident of [facility] sent to the ED for eval. of SOB. As per patient, this morning she woke up with worsening of SOB.
89 yo F with PMH significant for HTN, Systolic CHF resident of [facility] sent to the ED for eval. of SOB. As per patient, this morning she woke up with worsening of SOB.
Essential hypertension: Essential hypertension  Acute on chronic systolic (congestive) heart failure: Acute on chronic systolic (congestive) heart failure    less sob today  monitor ProBNP/Electrlytes  agressive diuresis as ordered  continue ACE inhibitor  Low dose Beta blocker  strict i/o,daily weight  2D echo for EF  Serial CE/EKG

## 2017-03-26 NOTE — PROGRESS NOTE ADULT - PROBLEM SELECTOR PROBLEM 6
YUNIOR (acute kidney injury)
YUNOIR (acute kidney injury)
YUNIOR (acute kidney injury)

## 2017-03-26 NOTE — PROGRESS NOTE ADULT - PROBLEM SELECTOR PLAN 6
due to IV lasix  Hold lasix for now and check BMP in am
due to IV lasix, resolved  Follow BMP
due to IV lasix, resolved  Follow BMP
due to IV lasix, resolving  Follow BMP
due to IV lasix  Hold lasix for now and check BMP in am

## 2017-03-26 NOTE — PROGRESS NOTE ADULT - PROBLEM SELECTOR PLAN 5
~Vte ppx; c/w Heparin sq

## 2017-03-27 VITALS
TEMPERATURE: 98 F | SYSTOLIC BLOOD PRESSURE: 137 MMHG | OXYGEN SATURATION: 95 % | RESPIRATION RATE: 18 BRPM | HEART RATE: 55 BPM | DIASTOLIC BLOOD PRESSURE: 51 MMHG

## 2017-03-27 LAB
ANION GAP SERPL CALC-SCNC: 8 MMOL/L — SIGNIFICANT CHANGE UP (ref 5–17)
BUN SERPL-MCNC: 49 MG/DL — HIGH (ref 7–23)
CALCIUM SERPL-MCNC: 9 MG/DL — SIGNIFICANT CHANGE UP (ref 8.5–10.1)
CHLORIDE SERPL-SCNC: 109 MMOL/L — HIGH (ref 96–108)
CO2 SERPL-SCNC: 27 MMOL/L — SIGNIFICANT CHANGE UP (ref 22–31)
CREAT SERPL-MCNC: 1.26 MG/DL — SIGNIFICANT CHANGE UP (ref 0.5–1.3)
GLUCOSE SERPL-MCNC: 82 MG/DL — SIGNIFICANT CHANGE UP (ref 70–99)
POTASSIUM SERPL-MCNC: 4.9 MMOL/L — SIGNIFICANT CHANGE UP (ref 3.5–5.3)
POTASSIUM SERPL-SCNC: 4.9 MMOL/L — SIGNIFICANT CHANGE UP (ref 3.5–5.3)
SODIUM SERPL-SCNC: 144 MMOL/L — SIGNIFICANT CHANGE UP (ref 135–145)

## 2017-03-27 PROCEDURE — 99232 SBSQ HOSP IP/OBS MODERATE 35: CPT

## 2017-03-27 RX ORDER — AMLODIPINE BESYLATE 2.5 MG/1
2.5 TABLET ORAL DAILY
Qty: 0 | Refills: 0 | Status: DISCONTINUED | OUTPATIENT
Start: 2017-03-27 | End: 2017-03-27

## 2017-03-27 RX ORDER — FUROSEMIDE 40 MG
20 TABLET ORAL EVERY OTHER DAY
Qty: 0 | Refills: 0 | Status: DISCONTINUED | OUTPATIENT
Start: 2017-03-27 | End: 2017-03-27

## 2017-03-27 RX ORDER — RAMIPRIL 5 MG
2.5 CAPSULE ORAL
Qty: 0 | Refills: 0 | COMMUNITY

## 2017-03-27 RX ORDER — CARVEDILOL PHOSPHATE 80 MG/1
1 CAPSULE, EXTENDED RELEASE ORAL
Qty: 60 | Refills: 0 | OUTPATIENT
Start: 2017-03-27 | End: 2017-04-26

## 2017-03-27 RX ORDER — RAMIPRIL 5 MG
1 CAPSULE ORAL
Qty: 30 | Refills: 0 | OUTPATIENT
Start: 2017-03-27 | End: 2017-04-26

## 2017-03-27 RX ADMIN — PANTOPRAZOLE SODIUM 40 MILLIGRAM(S): 20 TABLET, DELAYED RELEASE ORAL at 06:44

## 2017-03-27 RX ADMIN — Medication 100 MILLIGRAM(S): at 06:43

## 2017-03-27 RX ADMIN — HEPARIN SODIUM 5000 UNIT(S): 5000 INJECTION INTRAVENOUS; SUBCUTANEOUS at 06:43

## 2017-03-27 RX ADMIN — Medication 81 MILLIGRAM(S): at 11:12

## 2017-03-27 RX ADMIN — LISINOPRIL 40 MILLIGRAM(S): 2.5 TABLET ORAL at 06:43

## 2017-03-27 RX ADMIN — Medication 20 MILLIGRAM(S): at 12:04

## 2017-03-27 RX ADMIN — AMLODIPINE BESYLATE 2.5 MILLIGRAM(S): 2.5 TABLET ORAL at 12:04

## 2017-03-27 RX ADMIN — Medication 20 MILLIGRAM(S): at 06:43

## 2017-03-27 RX ADMIN — CARVEDILOL PHOSPHATE 6.25 MILLIGRAM(S): 80 CAPSULE, EXTENDED RELEASE ORAL at 06:43

## 2017-03-27 NOTE — PROGRESS NOTE ADULT - PROBLEM SELECTOR PLAN 2
Repeat echocardiogram (limited study) to evaluate size of previously described pericardial effusion - "moderate" in size in January 2017.
Repeat echocardiogram (limited study) to evaluate size of previously described pericardial effusion - "moderate" in size in January 2017. no significant change in follow up echo
resolved
Clinically improved. Consider reduction of diuresis. Follow BUN/Cr. if no improvement consider reducing ACEI.

## 2017-03-27 NOTE — PROGRESS NOTE ADULT - PROBLEM SELECTOR PROBLEM 2
Leukocytosis, unspecified type
Pericardial effusion
Leukocytosis, unspecified type
Acute on chronic congestive heart failure, unspecified congestive heart failure type

## 2017-03-27 NOTE — PROGRESS NOTE ADULT - PROBLEM SELECTOR PLAN 3
- c/w Lasix/Ramipril, stop cardizem (stopped cardizem prior to this admission)  start low dose BB
Fair BP control;  discontinue lopressor , start on coreg
Fair BP control; continue metoprolol - would increase dose if BP remains elevated
improved , continue current medical regimen
improved , continue current medical regimen
- c/w Lasix/Ramipril, stop cardizem (stopped cardizem prior to this admission)  started low dose BB
Not controlled. Discussed with Dr Little.   Coreg was started  Continue linipril.
Not controlled. Discussed with Dr Little.   Coreg was started  Continue linipril.  Clonidine prn

## 2017-03-27 NOTE — PROGRESS NOTE ADULT - PROBLEM SELECTOR PROBLEM 3
Essential hypertension

## 2017-03-27 NOTE — PROGRESS NOTE ADULT - PROBLEM SELECTOR PLAN 1
# Dyspnea: 2/2 to acute on chronic systolic CHF (HFrEF)  cont IV diuresis  O2  BB  echo        - Dyspnea less likely due to PE --> CTA not indicated as patient's modified Well's is = 4.0.
Suspect acute on chronic diastolic HF as the etiology of her dyspnea / pulmonary edema due uncontrolled hypertension ;  discontinue lopressor , start on coreg 6.25 mg po BID , lasix as needed
Suspect acute on chronic diastolic HF as the etiology of her dyspnea / pulmonary edema;  LVEF was normal on TTE earlier this year; clinically improving; diuresis held due to worsening renal function.  Continue metoprolol.
improved with improvement of hypertension , continue  lasix , coreg ( do not increase dose ),  will norvasc  5mg po daily , discharge planning , monitor renal function
improved with improvement of hypertension , continue  lasix , coreg , continue
Acute hypoxic resp failure due to acute CHF  Change lasix to IV  Follow renal function  Cardiology follow up appreciated  Follow echo: normal EF  daily weight  Fluid restrictions.  O2 support
Acute hypoxic resp failure due to acute CHF  Resolved  Restart lasix po 20 mg daily.  Follow renal function  Cardiology follow up appreciated  Follow echo  daily weight  Fluid restrictions.  O2 support
Acute hypoxic resp failure due to acute CHF  Resolving  Hold lasix in am due to worsening renal function  Cardiology follow up appreciated  Follow echo  daily weight  Fluid restrictions.  O2 support
Acute hypoxic resp failure due to acute CHF, resolved.  Chr pericardial effusion: unchanged  Changed lasix to IV  Follow renal function  Cardiology follow up appreciated  Follow echo: normal EF  daily weight  Fluid restrictions.  O2 support
Resolving  Hold lasix in am due to worsening renal function  Cardiology follow up appreciated  Follow echo  daily weight  Fluid restrictions.
Blood pressure controlled. Continue antihypertensive therapy.

## 2017-03-27 NOTE — PROGRESS NOTE ADULT - SUBJECTIVE AND OBJECTIVE BOX
HPI:  89 year old woman with a history of HTN, pericardial effusion, and CHF presented to the ER on 3/21/17 with dyspnea and hypoxia - found to be in pulmonary edema and treated with IV furosemide and supplemental oxygen; worsening renal function attributed to diuresis.    3/24/17:  Continued improvement in dyspnea; no additional complaints.    3/25/2017 Events noted patient became more short of breath , noted to have uncontrolled hypertension ,patient recieved Iv lasix     MEDICATIONS  (STANDING):  aspirin  chewable 81milliGRAM(s) Oral daily  lisinopril 40milliGRAM(s) Oral daily  potassium chloride    Tablet ER 20milliEquivalent(s) Oral daily  pantoprazole    Tablet 40milliGRAM(s) Oral before breakfast  docusate sodium 100milliGRAM(s) Oral three times a day  metoprolol 12.5milliGRAM(s) Oral two times a day    MEDICATIONS  (PRN):  acetaminophen   Tablet 650milliGRAM(s) Oral every 6 hours PRN pain and fever  aluminum hydroxide/magnesium hydroxide/simethicone Suspension 30milliLiter(s) Oral every 4 hours PRN Dyspepsia  senna 2Tablet(s) Oral at bedtime PRN Constipation    Vital Signs Last 24 Hrs  T(C): 36.7, Max: 36.7 (03-25 @ 05:26)  T(F): 98.1, Max: 98.1 (03-25 @ 05:26)  HR: 65 (62 - 68)  BP: 185/82 (161/64 - 205/76)  BP(mean): --  RR: 17 (17 - 18)  SpO2: 100% (95% - 100%)      PHYSICAL EXAM:  Constitutional: Appears her stated age; seated in bedside chair, awake and alert, elevated JVD   Respiratory: Decreased breath sounds at the bases, nonlabored, receiving supplemental O2 via N/C  Cardiovascular: S1 and S2, regular rate and rhythm, psm   Gastrointestinal: Bowel Sounds present, soft, nontender, nondistended  Extremities: No peripheral edema  Skin: No rashes    LABS:                                  10.2   9.4   )-----------( 361      ( 24 Mar 2017 06:22 )             34.4     25 Mar 2017 05:53    143    |  107    |  44     ----------------------------<  88     5.3     |  28     |  1.33     Ca    8.6        25 Mar 2017 05:53                ECG (3/23/17): Sinus rhythm with sinus arrhythmia, abnormal R wave progression across precordium    Echo (1/2017):  Estimated left ventricular ejection fraction is 55 %.  Trace aortic regurgitation.  Trace mitral regurgitation.  EA reversal of the mitral inflow consistent with reduced compliance of the left ventricle.  A moderate pericardial effusion is present.            Echo follow up 3/24/2017 Summary     Only limited echocardiography views were obtained.   Left ventricle systolic function is preserved.   Estimated left ventricular ejection fraction is 65-70%.   Moderate mitral annular calcification is present.   A moderate circumferential pericardial effusion is present .   Appears similar to prior study dated 01/06/2017   Dr. Santoro aware of study findings.   Pleural effusion - is present..     Signature     ----------------------------------------------------------------   Electronically signed by Norberto Nichols MD(Interpreting   physician) on 03/24/2017 06:17 PM
HPI:  89 year old woman with a history of HTN, pericardial effusion, and CHF presented to the ER on 3/21/17 with dyspnea and hypoxia - found to be in pulmonary edema and treated with IV furosemide and supplemental oxygen; worsening renal function attributed to diuresis.    3/24/17:  Continued improvement in dyspnea; no additional complaints.    3/25/2017 Events noted patient became more short of breath , noted to have uncontrolled hypertension ,patient recieved Iv lasix   3/26/2017  Patient is feeling much better , was walking with walker slowly , blood pressure is better today     MEDICATIONS  (STANDING):  aspirin  chewable 81milliGRAM(s) Oral daily  lisinopril 40milliGRAM(s) Oral daily  pantoprazole    Tablet 40milliGRAM(s) Oral before breakfast  docusate sodium 100milliGRAM(s) Oral three times a day  heparin  Injectable 5000Unit(s) SubCutaneous every 12 hours  carvedilol 6.25milliGRAM(s) Oral every 12 hours  furosemide   Injectable 20milliGRAM(s) IV Push daily    MEDICATIONS  (PRN):  acetaminophen   Tablet 650milliGRAM(s) Oral every 6 hours PRN pain and fever  aluminum hydroxide/magnesium hydroxide/simethicone Suspension 30milliLiter(s) Oral every 4 hours PRN Dyspepsia  senna 2Tablet(s) Oral at bedtime PRN Constipation  cloNIDine 0.1milliGRAM(s) Oral every 8 hours PRN for SBP >160    Vital Signs Last 24 Hrs  T(C): 36.2, Max: 36.9 (03-25 @ 16:25)  T(F): 97.2, Max: 98.5 (03-25 @ 16:25)  HR: 56 (56 - 66)  BP: 117/54 (111/37 - 196/74)  BP(mean): --  RR: 17 (17 - 20)  SpO2: 100% (97% - 100%)    I&O's Summary    PHYSICAL EXAM:  Constitutional: Appears her stated age; seated in bedside chair, awake and alert, elevated JVD   Respiratory: Decreased breath sounds at the bases, nonlabored, receiving supplemental O2 via N/C  Cardiovascular: S1 and S2, regular rate and rhythm, psm   Gastrointestinal: Bowel Sounds present, soft, nontender, nondistended  Extremities: No peripheral edema  Skin: No rashes    LABS:                                9.9    5.9   )-----------( 310      ( 26 Mar 2017 05:35 )             32.3     26 Mar 2017 05:35    143    |  107    |  50     ----------------------------<  82     5.4     |  28     |  1.37     Ca    8.4        26 Mar 2017 05:35                            ECG (3/23/17): Sinus rhythm with sinus arrhythmia, abnormal R wave progression across precordium    Echo (1/2017):  Estimated left ventricular ejection fraction is 55 %.  Trace aortic regurgitation.  Trace mitral regurgitation.  EA reversal of the mitral inflow consistent with reduced compliance of the left ventricle.  A moderate pericardial effusion is present.            Echo follow up 3/24/2017 Summary     Only limited echocardiography views were obtained.   Left ventricle systolic function is preserved.   Estimated left ventricular ejection fraction is 65-70%.   Moderate mitral annular calcification is present.   A moderate circumferential pericardial effusion is present .   Appears similar to prior study dated 01/06/2017   Dr. Santoro aware of study findings.   Pleural effusion - is present..     Signature     ----------------------------------------------------------------   Electronically signed by Norberto Nichols MD(Interpreting   physician) on 03/24/2017 06:17 PM
HPI:  89 year old woman with a history of HTN, pericardial effusion, and CHF presented to the ER on 3/21/17 with dyspnea and hypoxia - found to be in pulmonary edema and treated with IV furosemide and supplemental oxygen; worsening renal function attributed to diuresis.    3/24/17:  Continued improvement in dyspnea; no additional complaints.    3/25/2017 Events noted patient became more short of breath , noted to have uncontrolled hypertension ,patient recieved Iv lasix   3/26/2017  Patient is feeling much better , was walking with walker slowly , blood pressure is better today   3/27/2017  Patient sitting comfortable , able to walk  ,SOb better , her heart rate dropped to 40s while a sleep     MEDICATIONS  (STANDING):  aspirin  chewable 81milliGRAM(s) Oral daily  lisinopril 40milliGRAM(s) Oral daily  pantoprazole    Tablet 40milliGRAM(s) Oral before breakfast  docusate sodium 100milliGRAM(s) Oral three times a day  heparin  Injectable 5000Unit(s) SubCutaneous every 12 hours  carvedilol 6.25milliGRAM(s) Oral every 12 hours  furosemide    Tablet 20milliGRAM(s) Oral every other day    MEDICATIONS  (PRN):  acetaminophen   Tablet 650milliGRAM(s) Oral every 6 hours PRN pain and fever  aluminum hydroxide/magnesium hydroxide/simethicone Suspension 30milliLiter(s) Oral every 4 hours PRN Dyspepsia  senna 2Tablet(s) Oral at bedtime PRN Constipation  cloNIDine 0.1milliGRAM(s) Oral every 8 hours PRN for SBP >160    Vital Signs Last 24 Hrs  T(C): 36.8, Max: 36.8 (03-26 @ 16:34)  T(F): 98.2, Max: 98.3 (03-26 @ 20:55)  HR: 65 (56 - 70)  BP: 153/68 (117/54 - 187/58)  BP(mean): --  RR: 17 (17 - 17)  SpO2: 96% (96% - 100%)      PHYSICAL EXAM:  Constitutional: Appears her stated age; seated in bedside chair, awake and alert, elevated JVD   Respiratory: Decreased breath sounds at the bases, nonlabored, receiving supplemental O2 via N/C  Cardiovascular: S1 and S2, regular rate and rhythm, psm   Gastrointestinal: Bowel Sounds present, soft, nontender, nondistended  Extremities: No peripheral edema  Skin: No rashes    LABS:                                           9.9    5.9   )-----------( 310      ( 26 Mar 2017 05:35 )             32.3     27 Mar 2017 05:31    144    |  109    |  49     ----------------------------<  82     4.9     |  27     |  1.26     Ca    9.0        27 Mar 2017 05:31                                    Monitor  Sinus rhythm episodes of sinus bradycardia while sleep ,    ECG (3/23/17): Sinus rhythm with sinus arrhythmia, abnormal R wave progression across precordium    Echo (1/2017):  Estimated left ventricular ejection fraction is 55 %.  Trace aortic regurgitation.  Trace mitral regurgitation.  EA reversal of the mitral inflow consistent with reduced compliance of the left ventricle.  A moderate pericardial effusion is present.            Echo follow up 3/24/2017 Summary     Only limited echocardiography views were obtained.   Left ventricle systolic function is preserved.   Estimated left ventricular ejection fraction is 65-70%.   Moderate mitral annular calcification is present.   A moderate circumferential pericardial effusion is present .   Appears similar to prior study dated 01/06/2017   Dr. Santoro aware of study findings.   Pleural effusion - is present..     Signature     ----------------------------------------------------------------   Electronically signed by Norberto Nichols MD(Interpreting   physician) on 03/24/2017 06:17 PM
HPI:  89 year old woman with a history of HTN, pericardial effusion, and CHF presented to the ER on 3/21/17 with dyspnea and hypoxia - found to be in pulmonary edema and treated with IV furosemide and supplemental oxygen; worsening renal function attributed to diuresis.    3/24/17:  Continued improvement in dyspnea; no additional complaints.    MEDICATIONS  (STANDING):  aspirin  chewable 81milliGRAM(s) Oral daily  lisinopril 40milliGRAM(s) Oral daily  potassium chloride    Tablet ER 20milliEquivalent(s) Oral daily  pantoprazole    Tablet 40milliGRAM(s) Oral before breakfast  docusate sodium 100milliGRAM(s) Oral three times a day  metoprolol 12.5milliGRAM(s) Oral two times a day    MEDICATIONS  (PRN):  acetaminophen   Tablet 650milliGRAM(s) Oral every 6 hours PRN pain and fever  aluminum hydroxide/magnesium hydroxide/simethicone Suspension 30milliLiter(s) Oral every 4 hours PRN Dyspepsia  senna 2Tablet(s) Oral at bedtime PRN Constipation    Vital Signs Last 24 Hrs  T(C): 36.8, Max: 36.9 (03-24 @ 00:29)  T(F): 98.3, Max: 98.4 (03-24 @ 00:29)  HR: 71 (60 - 71)  BP: 165/64 (130/48 - 165/64)  BP(mean): --  RR: 17 (16 - 18)  SpO2: 94% (94% - 100%)    PHYSICAL EXAM:  Constitutional: Appears her stated age; seated in bedside chair, awake and alert  Respiratory: Decreased breath sounds at the bases, nonlabored, receiving supplemental O2 via N/C  Cardiovascular: S1 and S2, regular rate and rhythm, no Murmurs, gallops or rubs  Gastrointestinal: Bowel Sounds present, soft, nontender, nondistended  Extremities: No peripheral edema  Skin: No rashes    LABS:                   10.2   9.4   )-----------( 361      ( 24 Mar 2017 06:22 )             34.4     144    |  108    |  48     ----------------------------<  86     5.2     |  29     |  1.43     Ca    8.4        24 Mar 2017 06:22    ECG (3/23/17): Sinus rhythm with sinus arrhythmia, abnormal R wave progression across precordium    Echo (1/2017):  Estimated left ventricular ejection fraction is 55 %.  Trace aortic regurgitation.  Trace mitral regurgitation.  EA reversal of the mitral inflow consistent with reduced compliance of the left ventricle.  A moderate pericardial effusion is present.
PCP:    REQUESTING PHYSICIAN:    REASON FOR CONSULT:    CHIEF COMPLAINT:    HPI:  89 yo F with PMH significant for HTN, Systolic CHF resident of [facility] assistant living sent to the ED for eval. of SOB. As per patient, this morning she woke up with worsening of SOB. Pt states that for the past few days she feels exertional SOB that is progressively gotten worse today. Also c/o chest pain. Pt states that this morning she also noticed change in her speech describes as slurred speech. Denies tingling or numbness. Denies acute weakness on extremeties . Denies cough or palpitations. Denies abdominal pain, N/V/D/C. Denies fever or chills. Denies chest pain at present.     On arrival to the ED,  pt was desating to 70-80 % on RA, was placed on Ventimask and now gradually weaned on nasal canula, sating 100 % on 3 LNC.     In the ED:  D DImer: [id number]  Troponin : 0.052 >> o.036  3/23/17: Pt feels improved. No chest pain. Appetite good. NSR.      PAST MEDICAL & SURGICAL HISTORY:  CHF (congestive heart failure)  Essential hypertension  Congestive heart failure, unspecified congestive heart failure chronicity, unspecified congestive heart failure type  No significant past surgical history      SOCIAL HISTORY:    FAMILY HISTORY:  No pertinent family history in first degree relatives      ALLERGIES:  Allergies    ibuprofen (Unknown)  Originally Entered as [Unknown] reaction to [sulfur] (Unknown)  sulfa drugs (Unknown)    Intolerances        MEDICATIONS:    MEDICATIONS  (STANDING):  aspirin  chewable 81milliGRAM(s) Oral daily  lisinopril 40milliGRAM(s) Oral daily  potassium chloride    Tablet ER 20milliEquivalent(s) Oral daily  pantoprazole    Tablet 40milliGRAM(s) Oral before breakfast  docusate sodium 100milliGRAM(s) Oral three times a day  furosemide   Injectable 40milliGRAM(s) IV Push daily  metoprolol 12.5milliGRAM(s) Oral two times a day    MEDICATIONS  (PRN):  acetaminophen   Tablet 650milliGRAM(s) Oral every 6 hours PRN pain and fever  aluminum hydroxide/magnesium hydroxide/simethicone Suspension 30milliLiter(s) Oral every 4 hours PRN Dyspepsia  senna 2Tablet(s) Oral at bedtime PRN Constipation      REVIEW OF SYSTEMS:    CONSTITUTIONAL: No weakness, fevers or chills  EYES/ENT: No visual changes;  No vertigo or throat pain   NECK: No pain or stiffness  RESPIRATORY: No cough, wheezing, hemoptysis; No shortness of breath  CARDIOVASCULAR: No chest pain or palpitations  GASTROINTESTINAL: No abdominal or epigastric pain. No nausea, vomiting, or hematemesis; No diarrhea or constipation. No melena or hematochezia.  GENITOURINARY: No dysuria, frequency or hematuria  NEUROLOGICAL: No numbness or weakness  SKIN: No itching, burning, rashes, or lesions   All other review of systems is negative unless indicated above    Vital Signs Last 24 Hrs  T(C): 36.6, Max: 36.7 ( @ 17:04)  T(F): 97.9, Max: 98 ( @ 17:04)  HR: 60 (52 - 77)  BP: 118/66 (118/66 - 132/63)  BP(mean): --  RR: 16 (16 - 99)  SpO2: 98% (98% - 100%)Daily     Daily Weight in k.1 (23 Mar 2017 10:25)I&O's Summary      PHYSICAL EXAM:    Constitutional: NAD, awake and alert, well-developed  HEENT: PERR, EOMI,  No oral cyananosis.  Neck:  supple,  No JVD  Respiratory: Breath sounds are clear bilaterally, No wheezing, rales or rhonchi  Cardiovascular: S1 and S2, regular rate and rhythm, no Murmurs, gallops or rubs  Gastrointestinal: Bowel Sounds present, soft, nontender.   Extremities: No peripheral edema. No clubbing or cyanosis.  Vascular: 2+ peripheral pulses  Neurological: A/O x 3, no focal deficits  Musculoskeletal: no calf tenderness.  Skin: No rashes.      LABS: All Labs Reviewed:                        11.2   8.4   )-----------( 398      ( 22 Mar 2017 05:54 )             35.8                         11.6   12.2  )-----------( 464      ( 21 Mar 2017 19:54 )             37.9     23 Mar 2017 05:30    143    |  107    |  50     ----------------------------<  91     5.3     |  28     |  1.52   22 Mar 2017 05:54    145    |  109    |  41     ----------------------------<  84     4.7     |  27     |  1.21   21 Mar 2017 18:39    144    |  109    |  37     ----------------------------<  75     5.1     |  26     |  1.27     Ca    8.1        23 Mar 2017 05:30  Ca    8.7        22 Mar 2017 05:54  Ca    9.0        21 Mar 2017 18:39  Mg     2.3       22 Mar 2017 05:54    TPro  7.5    /  Alb  3.1    /  TBili  0.4    /  DBili  x      /  AST  33     /  ALT  13     /  AlkPhos  90     21 Mar 2017 18:39    PT/INR - ( 22 Mar 2017 05:54 )   PT: 10.2 sec;   INR: 0.95 ratio         PTT - ( 22 Mar 2017 05:54 )  PTT:29.6 sec  CARDIAC MARKERS ( 21 Mar 2017 22:32 )  0.036 ng/mL / x     / x     / x     / x      CARDIAC MARKERS ( 21 Mar 2017 18:39 )  0.052 ng/mL / x     / 76 U/L / x     / x          Blood Culture:    @ 00:30  Pro Bnp [id number]     @ 05:54  TSH: 1.920      RADIOLOGY/EKG:      ECHO/CARDIAC CATHTERIZATION/STRESS TEST:
Patient is a 89y old  Female who presents with a chief complaint of c/o SOB (21 Mar 2017 23:52)      SUBJECTIVE:   HPI:  89 yo F with PMH significant for HTN, Systolic CHF resident of [facility] living sent [facility] the [facility]. Noted to be in pulm edema. Today better with IV diuresis. Still requires 4L O2.     03/23/17: Patient seen and examined. Feels better today. SOB improving. Discussed with daughter and son at bed side regarding management and d/c plan.   03/24/17: Patient seen and examined. SOB improving. No new complaints. Discussed with patient, daughter and son at bed side regarding management and d/c plan.     Vital Signs Last 24 Hrs  T(C): 36.8, Max: 36.9 (03-24 @ 00:29)  T(F): 98.3, Max: 98.4 (03-24 @ 00:29)  HR: 71 (60 - 71)  BP: 165/64 (130/48 - 165/64)  BP(mean): --  RR: 17 (16 - 18)  SpO2: 94% (94% - 100%)        PHYSICAL EXAM:    Constitutional: NAD, awake and alert  HEENT: PERR, EOMI, Normal Hearing, MMM  Neck: Soft and supple, No LAD, No JVD  Respiratory: no rales or rhonchi  Cardiovascular: S1 and S2, regular rate and rhythm, no Murmurs, gallops or rubs  Gastrointestinal: Bowel Sounds present, soft, nontender, nondistended, no guarding, no rebound  Extremities: No peripheral edema  Vascular: 2+ peripheral pulses  Neurological: A/O x 3, no focal deficits  Musculoskeletal: 4/5 strength b/l upper and lower extremities  Skin: No rashes    MEDICATIONS  (STANDING):                        10.2   9.4   )-----------( 361      ( 24 Mar 2017 06:22 )             34.4     24 Mar 2017 06:22    144    |  108    |  48     ----------------------------<  86     5.2     |  29     |  1.43     Ca    8.4        24 Mar 2017 06:22          CAPILLARY BLOOD GLUCOSE        aspirin  chewable 81milliGRAM(s) Oral daily  lisinopril 40milliGRAM(s) Oral daily  potassium chloride    Tablet ER 20milliEquivalent(s) Oral daily  pantoprazole    Tablet 40milliGRAM(s) Oral before breakfast  docusate sodium 100milliGRAM(s) Oral three times a day  metoprolol 12.5milliGRAM(s) Oral two times a day  furosemide    Tablet 20milliGRAM(s) Oral daily  heparin  Injectable 5000Unit(s) SubCutaneous every 12 hours    MEDICATIONS  (PRN):  acetaminophen   Tablet 650milliGRAM(s) Oral every 6 hours PRN pain and fever  aluminum hydroxide/magnesium hydroxide/simethicone Suspension 30milliLiter(s) Oral every 4 hours PRN Dyspepsia  senna 2Tablet(s) Oral at bedtime PRN Constipation
Patient is a 89y old  Female who presents with a chief complaint of c/o SOB (21 Mar 2017 23:52)      SUBJECTIVE:   HPI:  89 yo F with PMH significant for HTN, Systolic CHF resident of [facility] assistant living sent to the [facility]. Noted to be in pulm edema. Today better with IV diuresis. Still requires 4L O2.     03/23/17: Patient seen and examined. Feels better today. SOB improving. Discussed with daughter and son at bed side regarding management and d/c plan.     Vital Signs Last 24 Hrs  T(C): 36.6, Max: 36.7 (03-22 @ 17:04)  T(F): 97.9, Max: 98 (03-22 @ 17:04)  HR: 60 (52 - 77)  BP: 118/66 (118/66 - 132/63)  BP(mean): --  RR: 16 (16 - 99)  SpO2: 98% (98% - 100%)        PHYSICAL EXAM:    Constitutional: NAD, awake and alert, well-developed  HEENT: PERR, EOMI, Normal Hearing, MMM  Neck: Soft and supple, No LAD, No JVD  Respiratory:rales bilaterally improved  Cardiovascular: S1 and S2, regular rate and rhythm, no Murmurs, gallops or rubs  Gastrointestinal: Bowel Sounds present, soft, nontender, nondistended, no guarding, no rebound  Extremities: No peripheral edema  Vascular: 2+ peripheral pulses  Neurological: A/O x 3, no focal deficits  Musculoskeletal: 4/5 strength b/l upper and lower extremities  Skin: No rashes    MEDICATIONS:  MEDICATIONS  (STANDING):  aspirin  chewable 81milliGRAM(s) Oral daily                        11.2   8.4   )-----------( 398      ( 22 Mar 2017 05:54 )             35.8     23 Mar 2017 05:30    143    |  107    |  50     ----------------------------<  91     5.3     |  28     |  1.52     Ca    8.1        23 Mar 2017 05:30  Mg     2.3       22 Mar 2017 05:54    TPro  7.5    /  Alb  3.1    /  TBili  0.4    /  DBili  x      /  AST  33     /  ALT  13     /  AlkPhos  90     21 Mar 2017 18:39    LIVER FUNCTIONS - ( 21 Mar 2017 18:39 )  Alb: 3.1 g/dL / Pro: 7.5 gm/dL / ALK PHOS: 90 U/L / ALT: 13 U/L / AST: 33 U/L / GGT: x           PT/INR - ( 22 Mar 2017 05:54 )   PT: 10.2 sec;   INR: 0.95 ratio         PTT - ( 22 Mar 2017 05:54 )  PTT:29.6 sec  CAPILLARY BLOOD GLUCOSE    CARDIAC MARKERS ( 21 Mar 2017 22:32 )  0.036 ng/mL / x     / x     / x     / x      CARDIAC MARKERS ( 21 Mar 2017 18:39 )  0.052 ng/mL / x     / 76 U/L / x     / x        MEDICATIONS  (STANDING):  aspirin  chewable 81milliGRAM(s) Oral daily  lisinopril 40milliGRAM(s) Oral daily  potassium chloride    Tablet ER 20milliEquivalent(s) Oral daily  pantoprazole    Tablet 40milliGRAM(s) Oral before breakfast  docusate sodium 100milliGRAM(s) Oral three times a day  metoprolol 12.5milliGRAM(s) Oral two times a day    MEDICATIONS  (PRN):  acetaminophen   Tablet 650milliGRAM(s) Oral every 6 hours PRN pain and fever  aluminum hydroxide/magnesium hydroxide/simethicone Suspension 30milliLiter(s) Oral every 4 hours PRN Dyspepsia  senna 2Tablet(s) Oral at bedtime PRN Constipation
Patient is a 89y old  Female who presents with a chief complaint of c/o SOB (21 Mar 2017 23:52)      SUBJECTIVE:   HPI:  89 yo F with PMH significant for HTN, Systolic CHF resident of [facility] living sent to the [facility]. Noted to be in pulm edema. Today better with IV diuresis. Still requires 4L O2.     03/23/17: Patient seen and examined. Feels better today. SOB improving. Discussed with daughter and son at bed side regarding management and d/c plan.   03/24/17: Patient seen and examined. SOB improving. No new complaints. Discussed with patient, daughter and son at bed side regarding management and d/c plan.   03/26/17: Patient seen and examined. Feels better today.       Vital Signs Last 24 Hrs  T(C): 36.2, Max: 36.9 (03-25 @ 16:25)  T(F): 97.2, Max: 98.5 (03-25 @ 16:25)  HR: 56 (56 - 66)  BP: 117/54 (111/37 - 196/74)  BP(mean): --  RR: 17 (17 - 20)  SpO2: 100% (97% - 100%)      PHYSICAL EXAM:    Constitutional: NAD, awake and alert  HEENT: PERR, EOMI, Normal Hearing, MMM  Neck: Soft and supple, No LAD, No JVD  Respiratory: no rales or rhonchi  Cardiovascular: S1 and S2, regular rate and rhythm, no Murmurs, gallops or rubs  Gastrointestinal: Bowel Sounds present, soft, nontender, nondistended, no guarding, no rebound  Extremities: No peripheral edema  Vascular: 2+ peripheral pulses  Neurological: A/O x 3, no focal deficits  Musculoskeletal: 4/5 strength b/l upper and lower extremities  Skin: No rashes                                          9.9    5.9   )-----------( 310      ( 26 Mar 2017 05:35 )             32.3     26 Mar 2017 05:35    143    |  107    |  50     ----------------------------<  82     5.4     |  28     |  1.37     Ca    8.4        26 Mar 2017 05:35          CAPILLARY BLOOD GLUCOSE              MEDICATIONS  (STANDING):  aspirin  chewable 81milliGRAM(s) Oral daily  lisinopril 40milliGRAM(s) Oral daily  pantoprazole    Tablet 40milliGRAM(s) Oral before breakfast  docusate sodium 100milliGRAM(s) Oral three times a day  heparin  Injectable 5000Unit(s) SubCutaneous every 12 hours  carvedilol 6.25milliGRAM(s) Oral every 12 hours  furosemide   Injectable 20milliGRAM(s) IV Push daily    MEDICATIONS  (PRN):  acetaminophen   Tablet 650milliGRAM(s) Oral every 6 hours PRN pain and fever  aluminum hydroxide/magnesium hydroxide/simethicone Suspension 30milliLiter(s) Oral every 4 hours PRN Dyspepsia  senna 2Tablet(s) Oral at bedtime PRN Constipation  cloNIDine 0.1milliGRAM(s) Oral every 8 hours PRN Hold systolic less than 160
Patient is a 89y old  Female who presents with a chief complaint of c/o SOB (21 Mar 2017 23:52)      SUBJECTIVE:   HPI:  89 yo F with PMH significant for HTN, Systolic CHF resident of [facility] assistant living sent to the [facility]. Noted to be in pulm edema. Today better with IV diuresis. Still requires 4L O2.     03/23/17: Patient seen and examined. Feels better today. SOB improving. Discussed with daughter and son at bed side regarding management and d/c plan.     Vital Signs Last 24 Hrs  T(C): 36.6, Max: 36.7 (03-22 @ 17:04)  T(F): 97.9, Max: 98 (03-22 @ 17:04)  HR: 60 (52 - 77)  BP: 118/66 (118/66 - 132/63)  BP(mean): --  RR: 16 (16 - 99)  SpO2: 98% (98% - 100%)        PHYSICAL EXAM:    Constitutional: NAD, awake and alert, well-developed  HEENT: PERR, EOMI, Normal Hearing, MMM  Neck: Soft and supple, No LAD, No JVD  Respiratory:rales bilaterally improved  Cardiovascular: S1 and S2, regular rate and rhythm, no Murmurs, gallops or rubs  Gastrointestinal: Bowel Sounds present, soft, nontender, nondistended, no guarding, no rebound  Extremities: No peripheral edema  Vascular: 2+ peripheral pulses  Neurological: A/O x 3, no focal deficits  Musculoskeletal: 4/5 strength b/l upper and lower extremities  Skin: No rashes    MEDICATIONS:  MEDICATIONS  (STANDING):  aspirin  chewable 81milliGRAM(s) Oral daily                        11.2   8.4   )-----------( 398      ( 22 Mar 2017 05:54 )             35.8     23 Mar 2017 05:30    143    |  107    |  50     ----------------------------<  91     5.3     |  28     |  1.52     Ca    8.1        23 Mar 2017 05:30  Mg     2.3       22 Mar 2017 05:54    TPro  7.5    /  Alb  3.1    /  TBili  0.4    /  DBili  x      /  AST  33     /  ALT  13     /  AlkPhos  90     21 Mar 2017 18:39    LIVER FUNCTIONS - ( 21 Mar 2017 18:39 )  Alb: 3.1 g/dL / Pro: 7.5 gm/dL / ALK PHOS: 90 U/L / ALT: 13 U/L / AST: 33 U/L / GGT: x           PT/INR - ( 22 Mar 2017 05:54 )   PT: 10.2 sec;   INR: 0.95 ratio         PTT - ( 22 Mar 2017 05:54 )  PTT:29.6 sec  CAPILLARY BLOOD GLUCOSE    CARDIAC MARKERS ( 21 Mar 2017 22:32 )  0.036 ng/mL / x     / x     / x     / x      CARDIAC MARKERS ( 21 Mar 2017 18:39 )  0.052 ng/mL / x     / 76 U/L / x     / x        MEDICATIONS  (STANDING):  aspirin  chewable 81milliGRAM(s) Oral daily  lisinopril 40milliGRAM(s) Oral daily  potassium chloride    Tablet ER 20milliEquivalent(s) Oral daily  pantoprazole    Tablet 40milliGRAM(s) Oral before breakfast  docusate sodium 100milliGRAM(s) Oral three times a day  metoprolol 12.5milliGRAM(s) Oral two times a day    MEDICATIONS  (PRN):  acetaminophen   Tablet 650milliGRAM(s) Oral every 6 hours PRN pain and fever  aluminum hydroxide/magnesium hydroxide/simethicone Suspension 30milliLiter(s) Oral every 4 hours PRN Dyspepsia  senna 2Tablet(s) Oral at bedtime PRN Constipation
Patient is a 89y old  Female who presents with a chief complaint of c/o SOB (21 Mar 2017 23:52)      SUBJECTIVE:   HPI:  89 yo F with PMH significant for HTN, Systolic CHF resident of [facility] assistant living sent to the [facility]. Noted to be in pulm edema. Today better with IV diuresis. Still requires 4L O2.     03/23/17: Patient seen and examined. Feels better today. SOB improving. Discussed with daughter and son at bed side regarding management and d/c plan.   03/24/17: Patient seen and examined. SOB improving. No new complaints. Discussed with patient, daughter and son at bed side regarding management and d/c plan.       Vital Signs Last 24 Hrs  T(C): 36.7, Max: 36.7 (03-25 @ 05:26)  T(F): 98.1, Max: 98.1 (03-25 @ 05:26)  HR: 65 (62 - 68)  BP: 185/82 (161/64 - 205/76)  BP(mean): --  RR: 17 (17 - 18)  SpO2: 100% (95% - 100%)        PHYSICAL EXAM:    Constitutional: NAD, awake and alert  HEENT: PERR, EOMI, Normal Hearing, MMM  Neck: Soft and supple, No LAD, No JVD  Respiratory: no rales or rhonchi  Cardiovascular: S1 and S2, regular rate and rhythm, no Murmurs, gallops or rubs  Gastrointestinal: Bowel Sounds present, soft, nontender, nondistended, no guarding, no rebound  Extremities: No peripheral edema  Vascular: 2+ peripheral pulses  Neurological: A/O x 3, no focal deficits  Musculoskeletal: 4/5 strength b/l upper and lower extremities  Skin: No rashes                               10.2   9.4   )-----------( 361      ( 24 Mar 2017 06:22 )             34.4     25 Mar 2017 05:53    143    |  107    |  44     ----------------------------<  88     5.3     |  28     |  1.33     Ca    8.6        25 Mar 2017 05:53          CAPILLARY BLOOD GLUCOSE        Meds:    aspirin  chewable 81milliGRAM(s) Oral daily  lisinopril 40milliGRAM(s) Oral daily  potassium chloride    Tablet ER 20milliEquivalent(s) Oral daily  pantoprazole    Tablet 40milliGRAM(s) Oral before breakfast  docusate sodium 100milliGRAM(s) Oral three times a day  metoprolol 12.5milliGRAM(s) Oral two times a day  furosemide    Tablet 20milliGRAM(s) Oral daily  heparin  Injectable 5000Unit(s) SubCutaneous every 12 hours    MEDICATIONS  (PRN):  acetaminophen   Tablet 650milliGRAM(s) Oral every 6 hours PRN pain and fever  aluminum hydroxide/magnesium hydroxide/simethicone Suspension 30milliLiter(s) Oral every 4 hours PRN Dyspepsia  senna 2Tablet(s) Oral at bedtime PRN Constipation
Patient is a 89y old  Female who presents with a chief complaint of c/o SOB (21 Mar 2017 23:52)      SUBJECTIVE:   HPI:  89 yo F with PMH significant for HTN, Systolic CHF resident of [facility] assistant living sent to the [facility]. Noted to be in pulm edema. Today better with IV diuresis. Still requires 4L O2.       ICU Vital Signs Last 24 Hrs  T(C): 36.7, Max: 36.9 (03-22 @ 02:34)  T(F): 98, Max: 98.4 (03-22 @ 02:34)  HR: 77 (69 - 77)  BP: 166/73 (140/61 - 190/90)  BP(mean): --  ABP: --  ABP(mean): --  RR: 16 (16 - 19)  SpO2: 99% (96% - 100%)        PHYSICAL EXAM:    Constitutional: NAD, awake and alert, well-developed  HEENT: PERR, EOMI, Normal Hearing, MMM  Neck: Soft and supple, No LAD, No JVD  Respiratory:rales bilaterally  Cardiovascular: S1 and S2, regular rate and rhythm, no Murmurs, gallops or rubs  Gastrointestinal: Bowel Sounds present, soft, nontender, nondistended, no guarding, no rebound  Extremities: No peripheral edema  Vascular: 2+ peripheral pulses  Neurological: A/O x 3, no focal deficits  Musculoskeletal: 4/5 strength b/l upper and lower extremities  Skin: No rashes    MEDICATIONS:  MEDICATIONS  (STANDING):  aspirin  chewable 81milliGRAM(s) Oral daily  lisinopril 40milliGRAM(s) Oral daily  potassium chloride    Tablet ER 20milliEquivalent(s) Oral daily  pantoprazole    Tablet 40milliGRAM(s) Oral before breakfast  docusate sodium 100milliGRAM(s) Oral three times a day  furosemide   Injectable 40milliGRAM(s) IV Push daily  metoprolol 12.5milliGRAM(s) Oral two times a day      LABS: All Labs Reviewed:                        11.2   8.4   )-----------( 398      ( 22 Mar 2017 05:54 )             35.8     22 Mar 2017 05:54    145    |  109    |  41     ----------------------------<  84     4.7     |  27     |  1.21     Ca    8.7        22 Mar 2017 05:54  Mg     2.3       22 Mar 2017 05:54    TPro  7.5    /  Alb  3.1    /  TBili  0.4    /  DBili  x      /  AST  33     /  ALT  13     /  AlkPhos  90     21 Mar 2017 18:39    PT/INR - ( 22 Mar 2017 05:54 )   PT: 10.2 sec;   INR: 0.95 ratio         PTT - ( 22 Mar 2017 05:54 )  PTT:29.6 sec  CARDIAC MARKERS ( 21 Mar 2017 22:32 )  0.036 ng/mL / x     / x     / x     / x      CARDIAC MARKERS ( 21 Mar 2017 18:39 )  0.052 ng/mL / x     / 76 U/L / x     / x              Blood Culture:     RADIOLOGY/EKG:Moderate pulmonary edema.

## 2017-03-27 NOTE — PROGRESS NOTE ADULT - PROBLEM SELECTOR PROBLEM 1
Acute on chronic diastolic congestive heart failure
Acute on chronic systolic (congestive) heart failure
Essential hypertension

## 2017-03-27 NOTE — PROGRESS NOTE ADULT - PROBLEM SELECTOR PLAN 4
Modest improvement in serum Cr;  monitor renal function as patient is mild prerenal azotemic
Modest improvement in serum Cr;  monitor renal function as patient is mild prerenal azotemic
Modest improvement in serum Cr; consider decreasing dose of ACE-I; resume lasix (oral dosing) if Cr continues to improve
Modest improvement in serum Cr; consider decreasing dose of ACE-I; resume lasix (oral dosing) if Cr continues to improve
c/w colace and senna

## 2017-03-27 NOTE — PROGRESS NOTE ADULT - PROBLEM SELECTOR PROBLEM 4
Constipation
YUNIOR (acute kidney injury)
Constipation

## 2017-03-30 DIAGNOSIS — J96.01 ACUTE RESPIRATORY FAILURE WITH HYPOXIA: ICD-10-CM

## 2017-03-30 DIAGNOSIS — D72.829 ELEVATED WHITE BLOOD CELL COUNT, UNSPECIFIED: ICD-10-CM

## 2017-03-30 DIAGNOSIS — Z79.899 OTHER LONG TERM (CURRENT) DRUG THERAPY: ICD-10-CM

## 2017-03-30 DIAGNOSIS — Z88.2 ALLERGY STATUS TO SULFONAMIDES: ICD-10-CM

## 2017-03-30 DIAGNOSIS — R06.02 SHORTNESS OF BREATH: ICD-10-CM

## 2017-03-30 DIAGNOSIS — K59.00 CONSTIPATION, UNSPECIFIED: ICD-10-CM

## 2017-03-30 DIAGNOSIS — I10 ESSENTIAL (PRIMARY) HYPERTENSION: ICD-10-CM

## 2017-03-30 DIAGNOSIS — I50.43 ACUTE ON CHRONIC COMBINED SYSTOLIC (CONGESTIVE) AND DIASTOLIC (CONGESTIVE) HEART FAILURE: ICD-10-CM

## 2017-03-30 DIAGNOSIS — Z88.8 ALLERGY STATUS TO OTHER DRUGS, MEDICAMENTS AND BIOLOGICAL SUBSTANCES STATUS: ICD-10-CM

## 2017-03-31 DIAGNOSIS — I11.0 HYPERTENSIVE HEART DISEASE WITH HEART FAILURE: ICD-10-CM

## 2018-05-07 ENCOUNTER — INPATIENT (INPATIENT)
Facility: HOSPITAL | Age: 83
LOS: 5 days | Discharge: NURSING, REHAB OR LONG-TERM CARE | End: 2018-05-13
Attending: FAMILY MEDICINE | Admitting: FAMILY MEDICINE
Payer: MEDICARE

## 2018-05-07 VITALS
WEIGHT: 139.99 LBS | RESPIRATION RATE: 16 BRPM | SYSTOLIC BLOOD PRESSURE: 155 MMHG | HEIGHT: 64 IN | HEART RATE: 76 BPM | DIASTOLIC BLOOD PRESSURE: 54 MMHG | OXYGEN SATURATION: 99 % | TEMPERATURE: 97 F

## 2018-05-07 LAB
ALBUMIN SERPL ELPH-MCNC: 3.4 G/DL — SIGNIFICANT CHANGE UP (ref 3.3–5)
ALP SERPL-CCNC: 77 U/L — SIGNIFICANT CHANGE UP (ref 40–120)
ALT FLD-CCNC: 12 U/L — SIGNIFICANT CHANGE UP (ref 12–78)
ANION GAP SERPL CALC-SCNC: 12 MMOL/L — SIGNIFICANT CHANGE UP (ref 5–17)
APPEARANCE UR: CLEAR — SIGNIFICANT CHANGE UP
AST SERPL-CCNC: 30 U/L — SIGNIFICANT CHANGE UP (ref 15–37)
BASOPHILS # BLD AUTO: 0.02 K/UL — SIGNIFICANT CHANGE UP (ref 0–0.2)
BASOPHILS NFR BLD AUTO: 0.1 % — SIGNIFICANT CHANGE UP (ref 0–2)
BILIRUB SERPL-MCNC: 0.5 MG/DL — SIGNIFICANT CHANGE UP (ref 0.2–1.2)
BILIRUB UR-MCNC: NEGATIVE — SIGNIFICANT CHANGE UP
BUN SERPL-MCNC: 59 MG/DL — HIGH (ref 7–23)
CALCIUM SERPL-MCNC: 9.7 MG/DL — SIGNIFICANT CHANGE UP (ref 8.5–10.1)
CHLORIDE SERPL-SCNC: 104 MMOL/L — SIGNIFICANT CHANGE UP (ref 96–108)
CO2 SERPL-SCNC: 23 MMOL/L — SIGNIFICANT CHANGE UP (ref 22–31)
COLOR SPEC: YELLOW — SIGNIFICANT CHANGE UP
CREAT SERPL-MCNC: 1.95 MG/DL — HIGH (ref 0.5–1.3)
DIFF PNL FLD: (no result)
EOSINOPHIL # BLD AUTO: 0.01 K/UL — SIGNIFICANT CHANGE UP (ref 0–0.5)
EOSINOPHIL NFR BLD AUTO: 0.1 % — SIGNIFICANT CHANGE UP (ref 0–6)
GLUCOSE SERPL-MCNC: 107 MG/DL — HIGH (ref 70–99)
GLUCOSE UR QL: NEGATIVE MG/DL — SIGNIFICANT CHANGE UP
HCT VFR BLD CALC: 43.1 % — SIGNIFICANT CHANGE UP (ref 34.5–45)
HGB BLD-MCNC: 13.7 G/DL — SIGNIFICANT CHANGE UP (ref 11.5–15.5)
IMM GRANULOCYTES NFR BLD AUTO: 0.6 % — SIGNIFICANT CHANGE UP (ref 0–1.5)
KETONES UR-MCNC: NEGATIVE — SIGNIFICANT CHANGE UP
LACTATE SERPL-SCNC: 1.2 MMOL/L — SIGNIFICANT CHANGE UP (ref 0.7–2)
LEUKOCYTE ESTERASE UR-ACNC: (no result)
LYMPHOCYTES # BLD AUTO: 0.75 K/UL — LOW (ref 1–3.3)
LYMPHOCYTES # BLD AUTO: 3.9 % — LOW (ref 13–44)
MCHC RBC-ENTMCNC: 26.1 PG — LOW (ref 27–34)
MCHC RBC-ENTMCNC: 31.8 GM/DL — LOW (ref 32–36)
MCV RBC AUTO: 82.1 FL — SIGNIFICANT CHANGE UP (ref 80–100)
MONOCYTES # BLD AUTO: 0.83 K/UL — SIGNIFICANT CHANGE UP (ref 0–0.9)
MONOCYTES NFR BLD AUTO: 4.3 % — SIGNIFICANT CHANGE UP (ref 2–14)
NEUTROPHILS # BLD AUTO: 17.75 K/UL — HIGH (ref 1.8–7.4)
NEUTROPHILS NFR BLD AUTO: 91 % — HIGH (ref 43–77)
NITRITE UR-MCNC: POSITIVE
PH UR: 6 — SIGNIFICANT CHANGE UP (ref 5–8)
PLATELET # BLD AUTO: 312 K/UL — SIGNIFICANT CHANGE UP (ref 150–400)
POTASSIUM SERPL-MCNC: 5.2 MMOL/L — SIGNIFICANT CHANGE UP (ref 3.5–5.3)
POTASSIUM SERPL-SCNC: 5.2 MMOL/L — SIGNIFICANT CHANGE UP (ref 3.5–5.3)
PROT SERPL-MCNC: 7.7 GM/DL — SIGNIFICANT CHANGE UP (ref 6–8.3)
PROT UR-MCNC: 100 MG/DL
RBC # BLD: 5.25 M/UL — HIGH (ref 3.8–5.2)
RBC # FLD: 17 % — HIGH (ref 10.3–14.5)
SODIUM SERPL-SCNC: 139 MMOL/L — SIGNIFICANT CHANGE UP (ref 135–145)
SP GR SPEC: 1.01 — SIGNIFICANT CHANGE UP (ref 1.01–1.02)
UROBILINOGEN FLD QL: NEGATIVE MG/DL — SIGNIFICANT CHANGE UP
WBC # BLD: 19.47 K/UL — HIGH (ref 3.8–10.5)
WBC # FLD AUTO: 19.47 K/UL — HIGH (ref 3.8–10.5)

## 2018-05-07 PROCEDURE — 99285 EMERGENCY DEPT VISIT HI MDM: CPT

## 2018-05-07 PROCEDURE — 72125 CT NECK SPINE W/O DYE: CPT | Mod: 26

## 2018-05-07 PROCEDURE — 73501 X-RAY EXAM HIP UNI 1 VIEW: CPT | Mod: 26

## 2018-05-07 PROCEDURE — 73560 X-RAY EXAM OF KNEE 1 OR 2: CPT | Mod: 26,50

## 2018-05-07 PROCEDURE — 93010 ELECTROCARDIOGRAM REPORT: CPT

## 2018-05-07 PROCEDURE — 70450 CT HEAD/BRAIN W/O DYE: CPT | Mod: 26

## 2018-05-07 PROCEDURE — 71045 X-RAY EXAM CHEST 1 VIEW: CPT | Mod: 26

## 2018-05-07 RX ORDER — CEFTRIAXONE 500 MG/1
1 INJECTION, POWDER, FOR SOLUTION INTRAMUSCULAR; INTRAVENOUS ONCE
Qty: 0 | Refills: 0 | Status: COMPLETED | OUTPATIENT
Start: 2018-05-07 | End: 2018-05-07

## 2018-05-07 RX ORDER — SODIUM CHLORIDE 9 MG/ML
500 INJECTION INTRAMUSCULAR; INTRAVENOUS; SUBCUTANEOUS ONCE
Qty: 0 | Refills: 0 | Status: COMPLETED | OUTPATIENT
Start: 2018-05-07 | End: 2018-05-07

## 2018-05-07 RX ORDER — PANTOPRAZOLE SODIUM 20 MG/1
40 TABLET, DELAYED RELEASE ORAL
Qty: 0 | Refills: 0 | Status: DISCONTINUED | OUTPATIENT
Start: 2018-05-07 | End: 2018-05-13

## 2018-05-07 RX ORDER — ACETAMINOPHEN 500 MG
650 TABLET ORAL EVERY 6 HOURS
Qty: 0 | Refills: 0 | Status: DISCONTINUED | OUTPATIENT
Start: 2018-05-07 | End: 2018-05-13

## 2018-05-07 RX ORDER — FUROSEMIDE 40 MG
1 TABLET ORAL
Qty: 0 | Refills: 0 | COMMUNITY

## 2018-05-07 RX ORDER — POTASSIUM CHLORIDE 20 MEQ
1 PACKET (EA) ORAL
Qty: 0 | Refills: 0 | COMMUNITY

## 2018-05-07 RX ORDER — HEPARIN SODIUM 5000 [USP'U]/ML
5000 INJECTION INTRAVENOUS; SUBCUTANEOUS EVERY 8 HOURS
Qty: 0 | Refills: 0 | Status: DISCONTINUED | OUTPATIENT
Start: 2018-05-07 | End: 2018-05-13

## 2018-05-07 RX ADMIN — CEFTRIAXONE 100 GRAM(S): 500 INJECTION, POWDER, FOR SOLUTION INTRAMUSCULAR; INTRAVENOUS at 19:21

## 2018-05-07 RX ADMIN — HEPARIN SODIUM 5000 UNIT(S): 5000 INJECTION INTRAVENOUS; SUBCUTANEOUS at 21:50

## 2018-05-07 RX ADMIN — SODIUM CHLORIDE 1000 MILLILITER(S): 9 INJECTION INTRAMUSCULAR; INTRAVENOUS; SUBCUTANEOUS at 18:28

## 2018-05-07 NOTE — ED PROVIDER NOTE - MUSCULOSKELETAL, MLM
Back, C-spine: Non-tender. No visible trauma to the back or head. No spinal TTP. +tenderness of right hip. +swelling of left knee with tenderness.

## 2018-05-07 NOTE — H&P ADULT - NSHPLABSRESULTS_GEN_ALL_CORE
EKG: NSR 81 bpm, prolonged /490 EKG: NSR 81 bpm, prolonged /490    < from: Transthoracic Echocardiogram Follow Up (03.24.17 @ 15:20) >       Summary     Only limited echocardiography views were obtained.   Left ventricle systolic function is preserved.   Estimated left ventricular ejection fraction is 65-70%.   Moderate mitral annular calcification is present.   A moderate circumferential pericardial effusion is present .   Appears similar to prior study dated 01/06/2017   Dr. Santoro aware of study findings.   Pleural effusion - is present..     Signature     ----------------------------------------------------------------   Electronically signed by Magen Nichols MD(Interpreting   physician) on 03/24/2017 06:17 PM   ----------------------------------------------------------------    Valves     Mitral Valve     Peak E-Wave: 128 cm/s   Peak Gradient: 6.55 mmHg    Structures                  MAGEN NICHOLS M.D., ATTENDING CARDIOLOGIST  This document has been electronically signed. Mar 24 2017  6:18PM    < end of copied text > EKG: NSR 81 bpm, prolonged /490        < from: Transthoracic Echocardiogram Follow Up (03.24.17 @ 15:20) >     Summary     Only limited echocardiography views were obtained.   Left ventricle systolic function is preserved.   Estimated left ventricular ejection fraction is 65-70%.   Moderate mitral annular calcification is present.   A moderate circumferential pericardial effusion is present .   Appears similar to prior study dated 01/06/2017   Dr. Santoro aware of study findings.   Pleural effusion - is present..     Signature     ----------------------------------------------------------------   Electronically signed by Magen Nichols MD(Interpreting   physician) on 03/24/2017 06:17 PM   ----------------------------------------------------------------    Valves     Mitral Valve     Peak E-Wave: 128 cm/s   Peak Gradient: 6.55 mmHg    Structures                  MAGEN NICHOLS M.D., ATTENDING CARDIOLOGIST  This document has been electronically signed. Mar 24 2017  6:18PM    < end of copied text >

## 2018-05-07 NOTE — H&P ADULT - HISTORY OF PRESENT ILLNESS
Pt is a  89 y/o woman with a PMHx of CHF, HTN presents to the ED from assisted living s/p mechanical fall today. Pt states she was in her bathroom having a BM at assisted living facility and slipped off and fell. Pt was then unable to get up due to b/l LE weakness, pain. No LOC. Unsure about head trauma. Pt reports left knee pain, right hip pain, weakness after the fall. Pt states she has ecchymosis below the right knee from fall. No fever or any other acute complaints at this time. Pt is ambulatory with a walker at baseline. Non smoker. On ASA. Pt is a  89 y/o woman with a PMHx of CHF, unspecified type, HTN presents to the ED from [facility] assisted living s/p mechanical fall today. Pt states she was in her bathroom having a BM  and slipped off and fell.   She was then unable to get up due to b/l LE weakness, pain. No LOC. Unsure about head trauma. Pt reports left knee pain, right hip pain, weakness after the fall. Pt states she has ecchymosis below the right knee from fall. No fever or any other acute complaints at this time. Pt is ambulatory with a rollator walker at baseline. Pt is on ASA.   She denies fever/chills, cpain/SOB,  abd, UTI or URI symptoms.  She has chronic urinary incontinence.    No other complaints.  She has poor po intake today,  per daughter-in-law at bedside. Pt is a  89 y/o woman with a PMHx of Diastolic CHF, unspecified type, HTN presents to the ED from [facility] assisted living s/p mechanical fall today. Pt states she was in her bathroom having a BM  and slipped off and fell.   She was then unable to get up due to b/l LE weakness, pain. No LOC. Unsure about head trauma. Pt reports left knee pain, right hip pain, weakness after the fall. Pt states she has ecchymosis below the right knee from fall. No fever or any other acute complaints at this time. Pt is ambulatory with a rollator walker at baseline. Pt is on ASA.   She denies fever/chills, cpain/SOB,  abd, UTI or URI symptoms.  She has chronic urinary incontinence.    No other complaints.  She has poor po intake today,  per daughter-in-law at bedside.     UA is positive and pt has leukocytosis

## 2018-05-07 NOTE — ED ADULT NURSE NOTE - OBJECTIVE STATEMENT
S/P fall, trauma alert, see trauma sheet. Pt biba from home s/p unwitnessed fall while in the bathroom.   Pt confused, poor historian. bruising bilateral knees

## 2018-05-07 NOTE — H&P ADULT - NSHPPHYSICALEXAM_GEN_ALL_CORE
ICU Vital Signs Last 24 Hrs    T(F): 97.4 (07 May 2018 16:46), Max: 97.4 (07 May 2018 16:46)  HR: 76 (07 May 2018 16:46) (76 - 76)  BP: 155/54 (07 May 2018 16:46) (155/54 - 155/54)    RR: 16 (07 May 2018 16:46) (16 - 16)  SpO2: 99% (07 May 2018 16:46) (99% - 99%)

## 2018-05-07 NOTE — ED PROVIDER NOTE - ATTENDING CONTRIBUTION TO CARE
Pt eval, treatment and plan contemporaneously with resident Adelina. Agree with notes. Patricia CRUM

## 2018-05-07 NOTE — ED PROVIDER NOTE - OBJECTIVE STATEMENT
89 y/o female with a PMHx of CHF, HTN presents to the ED from assisted living s/p mechanical fall today. Pt states she was in her bathroom having a BM at assisted living facility and slipped off and fell. Pt was then unable to get up due to b/l LE weakness, pain. No LOC. Unsure about head trauma. Pt reports left knee pain, right hip pain, weakness after the fall. Pt states she has ecchymosis below the right knee from fall. No fever or any other acute complaints at this time. Pt is ambulatory with a walker at baseline. Non smoker. On ASA.

## 2018-05-07 NOTE — H&P ADULT - ASSESSMENT
Pt is admitted w/    I. UTI , weakness and fall, pt hit his head  - Rocephin started in the ER  - follow ucx, blood cx    II. ARF,  acute on CKD II  - IVF  - follow renal function    III. DVT prophylaxis  - heparin Pt is a  89 y/o woman with a PMHx of CHF, unspecified type, HTN presents to the ED from [facility] assisted living s/p mechanical fall today. Pt states she was in her bathroom having a BM  and slipped off and fell.   She was then unable to get up due to b/l LE weakness, pain. No LOC. Unsure about head trauma. Pt reports left knee pain, right hip pain, weakness after the fall. Pt states she has ecchymosis below the right knee from fall. No fever or any other acute complaints at this time. Pt is ambulatory with a rollator walker at baseline. Pt is on ASA.   She denies fever/chills, cpain/SOB,  abd, UTI or URI symptoms.    She has chronic urinary incontinence.   No other complaints.  She has poor po intake today,  per daughter-in-law at bedside.  .     Pt is admitted w/    I. UTI , weakness and fall  - Rocephin started in the ER,  will cont  - follow ucx,   - blood cx drawn after antibiotics    II. ARF,  acute on CKD II  - IVF  - will hold torsemide tonight  - follow renal function    III. CHF, unspecified type chronic  - stable    IV. DVT prophylaxis  - heparin Pt is a  89 y/o woman with a PMHx of Diastolic CHF, unspecified type, HTN presents to the ED from [facility] assisted living s/p mechanical fall today. Pt states she was in her bathroom having a BM  and slipped off and fell.   She was then unable to get up due to b/l LE weakness, pain. No LOC. Unsure about head trauma. Pt reports left knee pain, right hip pain, weakness after the fall. Pt states she has ecchymosis below the right knee from fall. No fever or any other acute complaints at this time. Pt is ambulatory with a rollator walker at baseline. Pt is on ASA.   She denies fever/chills, cpain/SOB,  abd, UTI or URI symptoms.    She has chronic urinary incontinence.   No other complaints.  She has poor po intake today,  per daughter-in-law at bedside.  .     UA is positive and pt has leukocytosis, with increased neutrophils    Pt is admitted w/    I. UTI , complicated, vs sepsis of urinary origin ;  weakness and fall  - Rocephin started in the ER,  will cont  - follow ucx,   - blood cx were drawn  after antibiotics in ER  - physical therapy with rollator walker    II. ARF,  acute on CKD II  - IVF  - will hold torsemide tonight  - follow renal function    III. CHF, unspecified type chronic  - stable    IV. DVT prophylaxis  - heparin Pt is a  89 y/o woman with a PMHx of Diastolic CHF, unspecified type, HTN presents to the ED from [facility] assisted living s/p mechanical fall today. Pt states she was in her bathroom having a BM  and slipped off and fell.   She was then unable to get up due to b/l LE weakness, pain. No LOC. Unsure about head trauma. Pt reports left knee pain, right hip pain, weakness after the fall. Pt states she has ecchymosis below the right knee from fall. No fever or any other acute complaints at this time. Pt is ambulatory with a rollator walker at baseline. Pt is on ASA.   She denies fever/chills, cpain/SOB,  abd, UTI or URI symptoms.    She has chronic urinary incontinence.   No other complaints.  She has poor po intake today,  per daughter-in-law at bedside.  .     UA is positive and pt has leukocytosis, with increased neutrophils    Pt is admitted w/    I. UTI , complicated, vs sepsis of urinary origin ;  weakness and fall  - Rocephin started in the ER,  will cont  - follow ucx,   - blood cx were drawn  after antibiotics in ER  - physical therapy with rollator walker    II. ARF,  acute on CKD II  - IVF  - will hold torsemide and altace tonight  - follow renal function    III. CHF, unspecified type chronic  - stable    IV. DVT prophylaxis  - heparin

## 2018-05-07 NOTE — ED PROVIDER NOTE - SKIN, MLM
Skin normal color for race, warm, dry and intact. No evidence of rash. +ecchymosis in right anterior lower leg.

## 2018-05-08 LAB
ANION GAP SERPL CALC-SCNC: 8 MMOL/L — SIGNIFICANT CHANGE UP (ref 5–17)
BASOPHILS # BLD AUTO: 0.04 K/UL — SIGNIFICANT CHANGE UP (ref 0–0.2)
BASOPHILS NFR BLD AUTO: 0.3 % — SIGNIFICANT CHANGE UP (ref 0–2)
BUN SERPL-MCNC: 48 MG/DL — HIGH (ref 7–23)
CALCIUM SERPL-MCNC: 9.1 MG/DL — SIGNIFICANT CHANGE UP (ref 8.5–10.1)
CHLORIDE SERPL-SCNC: 109 MMOL/L — HIGH (ref 96–108)
CO2 SERPL-SCNC: 23 MMOL/L — SIGNIFICANT CHANGE UP (ref 22–31)
CREAT SERPL-MCNC: 1.56 MG/DL — HIGH (ref 0.5–1.3)
EOSINOPHIL # BLD AUTO: 0.03 K/UL — SIGNIFICANT CHANGE UP (ref 0–0.5)
EOSINOPHIL NFR BLD AUTO: 0.2 % — SIGNIFICANT CHANGE UP (ref 0–6)
GLUCOSE SERPL-MCNC: 93 MG/DL — SIGNIFICANT CHANGE UP (ref 70–99)
HCT VFR BLD CALC: 38.8 % — SIGNIFICANT CHANGE UP (ref 34.5–45)
HGB BLD-MCNC: 12.5 G/DL — SIGNIFICANT CHANGE UP (ref 11.5–15.5)
IMM GRANULOCYTES NFR BLD AUTO: 0.5 % — SIGNIFICANT CHANGE UP (ref 0–1.5)
LYMPHOCYTES # BLD AUTO: 0.89 K/UL — LOW (ref 1–3.3)
LYMPHOCYTES # BLD AUTO: 6.5 % — LOW (ref 13–44)
MCHC RBC-ENTMCNC: 26.6 PG — LOW (ref 27–34)
MCHC RBC-ENTMCNC: 32.2 GM/DL — SIGNIFICANT CHANGE UP (ref 32–36)
MCV RBC AUTO: 82.6 FL — SIGNIFICANT CHANGE UP (ref 80–100)
MONOCYTES # BLD AUTO: 0.57 K/UL — SIGNIFICANT CHANGE UP (ref 0–0.9)
MONOCYTES NFR BLD AUTO: 4.2 % — SIGNIFICANT CHANGE UP (ref 2–14)
NEUTROPHILS # BLD AUTO: 12.06 K/UL — HIGH (ref 1.8–7.4)
NEUTROPHILS NFR BLD AUTO: 88.3 % — HIGH (ref 43–77)
NRBC # BLD: 0 /100 WBCS — SIGNIFICANT CHANGE UP (ref 0–0)
PLATELET # BLD AUTO: 287 K/UL — SIGNIFICANT CHANGE UP (ref 150–400)
POTASSIUM SERPL-MCNC: 4.4 MMOL/L — SIGNIFICANT CHANGE UP (ref 3.5–5.3)
POTASSIUM SERPL-SCNC: 4.4 MMOL/L — SIGNIFICANT CHANGE UP (ref 3.5–5.3)
RBC # BLD: 4.7 M/UL — SIGNIFICANT CHANGE UP (ref 3.8–5.2)
RBC # FLD: 16.6 % — HIGH (ref 10.3–14.5)
SODIUM SERPL-SCNC: 140 MMOL/L — SIGNIFICANT CHANGE UP (ref 135–145)
WBC # BLD: 13.66 K/UL — HIGH (ref 3.8–10.5)
WBC # FLD AUTO: 13.66 K/UL — HIGH (ref 3.8–10.5)

## 2018-05-08 RX ORDER — SODIUM CHLORIDE 9 MG/ML
1000 INJECTION INTRAMUSCULAR; INTRAVENOUS; SUBCUTANEOUS
Qty: 0 | Refills: 0 | Status: DISCONTINUED | OUTPATIENT
Start: 2018-05-08 | End: 2018-05-09

## 2018-05-08 RX ORDER — ASPIRIN/CALCIUM CARB/MAGNESIUM 324 MG
81 TABLET ORAL DAILY
Qty: 0 | Refills: 0 | Status: DISCONTINUED | OUTPATIENT
Start: 2018-05-08 | End: 2018-05-13

## 2018-05-08 RX ORDER — SENNA PLUS 8.6 MG/1
1 TABLET ORAL AT BEDTIME
Qty: 0 | Refills: 0 | Status: DISCONTINUED | OUTPATIENT
Start: 2018-05-08 | End: 2018-05-13

## 2018-05-08 RX ORDER — CEFTRIAXONE 500 MG/1
1 INJECTION, POWDER, FOR SOLUTION INTRAMUSCULAR; INTRAVENOUS EVERY 24 HOURS
Qty: 0 | Refills: 0 | Status: DISCONTINUED | OUTPATIENT
Start: 2018-05-08 | End: 2018-05-08

## 2018-05-08 RX ORDER — TRAMADOL HYDROCHLORIDE 50 MG/1
25 TABLET ORAL EVERY 8 HOURS
Qty: 0 | Refills: 0 | Status: DISCONTINUED | OUTPATIENT
Start: 2018-05-08 | End: 2018-05-12

## 2018-05-08 RX ORDER — CEFTRIAXONE 500 MG/1
1000 INJECTION, POWDER, FOR SOLUTION INTRAMUSCULAR; INTRAVENOUS EVERY 24 HOURS
Qty: 0 | Refills: 0 | Status: DISCONTINUED | OUTPATIENT
Start: 2018-05-08 | End: 2018-05-10

## 2018-05-08 RX ORDER — DOCUSATE SODIUM 100 MG
100 CAPSULE ORAL DAILY
Qty: 0 | Refills: 0 | Status: DISCONTINUED | OUTPATIENT
Start: 2018-05-08 | End: 2018-05-13

## 2018-05-08 RX ORDER — DILTIAZEM HCL 120 MG
180 CAPSULE, EXT RELEASE 24 HR ORAL DAILY
Qty: 0 | Refills: 0 | Status: DISCONTINUED | OUTPATIENT
Start: 2018-05-08 | End: 2018-05-10

## 2018-05-08 RX ADMIN — PANTOPRAZOLE SODIUM 40 MILLIGRAM(S): 20 TABLET, DELAYED RELEASE ORAL at 06:00

## 2018-05-08 RX ADMIN — SODIUM CHLORIDE 70 MILLILITER(S): 9 INJECTION INTRAMUSCULAR; INTRAVENOUS; SUBCUTANEOUS at 14:11

## 2018-05-08 RX ADMIN — Medication 650 MILLIGRAM(S): at 12:41

## 2018-05-08 RX ADMIN — Medication 650 MILLIGRAM(S): at 22:01

## 2018-05-08 RX ADMIN — SENNA PLUS 1 TABLET(S): 8.6 TABLET ORAL at 22:00

## 2018-05-08 RX ADMIN — Medication 1 TABLET(S): at 12:41

## 2018-05-08 RX ADMIN — HEPARIN SODIUM 5000 UNIT(S): 5000 INJECTION INTRAVENOUS; SUBCUTANEOUS at 05:31

## 2018-05-08 RX ADMIN — HEPARIN SODIUM 5000 UNIT(S): 5000 INJECTION INTRAVENOUS; SUBCUTANEOUS at 12:41

## 2018-05-08 RX ADMIN — Medication 650 MILLIGRAM(S): at 23:00

## 2018-05-08 RX ADMIN — Medication 650 MILLIGRAM(S): at 14:11

## 2018-05-08 RX ADMIN — Medication 81 MILLIGRAM(S): at 12:41

## 2018-05-08 RX ADMIN — Medication 180 MILLIGRAM(S): at 05:31

## 2018-05-08 RX ADMIN — HEPARIN SODIUM 5000 UNIT(S): 5000 INJECTION INTRAVENOUS; SUBCUTANEOUS at 22:00

## 2018-05-08 RX ADMIN — CEFTRIAXONE 1000 MILLIGRAM(S): 500 INJECTION, POWDER, FOR SOLUTION INTRAMUSCULAR; INTRAVENOUS at 19:05

## 2018-05-08 NOTE — PROGRESS NOTE ADULT - ASSESSMENT
· Assessment		  Pt is a  89 y/o woman with a PMHx of Diastolic CHF, unspecified type, HTN presents to the ED from [facility] assisted living s/p mechanical fall today. Pt states she was in her bathroom having a BM  and slipped off and fell.   She was then unable to get up due to b/l LE weakness, pain. No LOC. Unsure about head trauma. Pt reports left knee pain, right hip pain, weakness after the fall. Pt states she has ecchymosis below the right knee from fall. No fever or any other acute complaints at this time. Pt is ambulatory with a rollator walker at baseline. Pt is on ASA.   She denies fever/chills, cpain/SOB,  abd, UTI or URI symptoms.    She has chronic urinary incontinence.   No other complaints.  She has poor po intake today,  per daughter-in-law at bedside.  .   Admitted with mechanical fall and gneralised weakness and found to have UTI and YUNIOR    UA is positive and pt has leukocytosis, with increased neutrophils    Pt is admitted w/    I. UTI , complicated, vs sepsis of urinary origin ;  weakness and fall  - Rocephin started in the ER,  will cont  - follow ucx,   - blood cx were drawn  after antibiotics in ER  - physical therapy with rollator walker    II. ARF,  acute on CKD II- improving with IVF  - IVF  - will hold torsemide and altace for now until cr normalises  - follow renal function    III. CHF, unspecified type chronic  - stable    IV. DVT prophylaxis  - heparin

## 2018-05-09 DIAGNOSIS — I63.9 CEREBRAL INFARCTION, UNSPECIFIED: ICD-10-CM

## 2018-05-09 DIAGNOSIS — I31.3 PERICARDIAL EFFUSION (NONINFLAMMATORY): ICD-10-CM

## 2018-05-09 DIAGNOSIS — I10 ESSENTIAL (PRIMARY) HYPERTENSION: ICD-10-CM

## 2018-05-09 DIAGNOSIS — I50.32 CHRONIC DIASTOLIC (CONGESTIVE) HEART FAILURE: ICD-10-CM

## 2018-05-09 LAB
-  AMIKACIN: SIGNIFICANT CHANGE UP
-  AMOXICILLIN/CLAVULANIC ACID: SIGNIFICANT CHANGE UP
-  AMPICILLIN/SULBACTAM: SIGNIFICANT CHANGE UP
-  AMPICILLIN: SIGNIFICANT CHANGE UP
-  AZTREONAM: SIGNIFICANT CHANGE UP
-  CEFAZOLIN: SIGNIFICANT CHANGE UP
-  CEFEPIME: SIGNIFICANT CHANGE UP
-  CEFOXITIN: SIGNIFICANT CHANGE UP
-  CEFTRIAXONE: SIGNIFICANT CHANGE UP
-  CIPROFLOXACIN: SIGNIFICANT CHANGE UP
-  ERTAPENEM: SIGNIFICANT CHANGE UP
-  GENTAMICIN: SIGNIFICANT CHANGE UP
-  IMIPENEM: SIGNIFICANT CHANGE UP
-  LEVOFLOXACIN: SIGNIFICANT CHANGE UP
-  MEROPENEM: SIGNIFICANT CHANGE UP
-  NITROFURANTOIN: SIGNIFICANT CHANGE UP
-  PIPERACILLIN/TAZOBACTAM: SIGNIFICANT CHANGE UP
-  TIGECYCLINE: SIGNIFICANT CHANGE UP
-  TOBRAMYCIN: SIGNIFICANT CHANGE UP
-  TRIMETHOPRIM/SULFAMETHOXAZOLE: SIGNIFICANT CHANGE UP
ANION GAP SERPL CALC-SCNC: 12 MMOL/L — SIGNIFICANT CHANGE UP (ref 5–17)
BASOPHILS # BLD AUTO: 0.02 K/UL — SIGNIFICANT CHANGE UP (ref 0–0.2)
BASOPHILS NFR BLD AUTO: 0.2 % — SIGNIFICANT CHANGE UP (ref 0–2)
BUN SERPL-MCNC: 42 MG/DL — HIGH (ref 7–23)
CALCIUM SERPL-MCNC: 8.9 MG/DL — SIGNIFICANT CHANGE UP (ref 8.5–10.1)
CHLORIDE SERPL-SCNC: 111 MMOL/L — HIGH (ref 96–108)
CO2 SERPL-SCNC: 20 MMOL/L — LOW (ref 22–31)
CREAT SERPL-MCNC: 1.38 MG/DL — HIGH (ref 0.5–1.3)
CULTURE RESULTS: SIGNIFICANT CHANGE UP
EOSINOPHIL # BLD AUTO: 0.03 K/UL — SIGNIFICANT CHANGE UP (ref 0–0.5)
EOSINOPHIL NFR BLD AUTO: 0.3 % — SIGNIFICANT CHANGE UP (ref 0–6)
GLUCOSE SERPL-MCNC: 105 MG/DL — HIGH (ref 70–99)
HCT VFR BLD CALC: 38.5 % — SIGNIFICANT CHANGE UP (ref 34.5–45)
HGB BLD-MCNC: 12.3 G/DL — SIGNIFICANT CHANGE UP (ref 11.5–15.5)
IMM GRANULOCYTES NFR BLD AUTO: 0.4 % — SIGNIFICANT CHANGE UP (ref 0–1.5)
LYMPHOCYTES # BLD AUTO: 0.8 K/UL — LOW (ref 1–3.3)
LYMPHOCYTES # BLD AUTO: 7.8 % — LOW (ref 13–44)
MCHC RBC-ENTMCNC: 26.5 PG — LOW (ref 27–34)
MCHC RBC-ENTMCNC: 31.9 GM/DL — LOW (ref 32–36)
MCV RBC AUTO: 82.8 FL — SIGNIFICANT CHANGE UP (ref 80–100)
METHOD TYPE: SIGNIFICANT CHANGE UP
MONOCYTES # BLD AUTO: 0.57 K/UL — SIGNIFICANT CHANGE UP (ref 0–0.9)
MONOCYTES NFR BLD AUTO: 5.5 % — SIGNIFICANT CHANGE UP (ref 2–14)
NEUTROPHILS # BLD AUTO: 8.86 K/UL — HIGH (ref 1.8–7.4)
NEUTROPHILS NFR BLD AUTO: 85.8 % — HIGH (ref 43–77)
NRBC # BLD: 0 /100 WBCS — SIGNIFICANT CHANGE UP (ref 0–0)
ORGANISM # SPEC MICROSCOPIC CNT: SIGNIFICANT CHANGE UP
ORGANISM # SPEC MICROSCOPIC CNT: SIGNIFICANT CHANGE UP
PLATELET # BLD AUTO: 271 K/UL — SIGNIFICANT CHANGE UP (ref 150–400)
POTASSIUM SERPL-MCNC: 4 MMOL/L — SIGNIFICANT CHANGE UP (ref 3.5–5.3)
POTASSIUM SERPL-SCNC: 4 MMOL/L — SIGNIFICANT CHANGE UP (ref 3.5–5.3)
RBC # BLD: 4.65 M/UL — SIGNIFICANT CHANGE UP (ref 3.8–5.2)
RBC # FLD: 17 % — HIGH (ref 10.3–14.5)
SODIUM SERPL-SCNC: 143 MMOL/L — SIGNIFICANT CHANGE UP (ref 135–145)
SPECIMEN SOURCE: SIGNIFICANT CHANGE UP
WBC # BLD: 10.32 K/UL — SIGNIFICANT CHANGE UP (ref 3.8–10.5)
WBC # FLD AUTO: 10.32 K/UL — SIGNIFICANT CHANGE UP (ref 3.8–10.5)

## 2018-05-09 PROCEDURE — 70544 MR ANGIOGRAPHY HEAD W/O DYE: CPT | Mod: 26,59

## 2018-05-09 PROCEDURE — 72128 CT CHEST SPINE W/O DYE: CPT | Mod: 26

## 2018-05-09 PROCEDURE — 99223 1ST HOSP IP/OBS HIGH 75: CPT

## 2018-05-09 PROCEDURE — 70551 MRI BRAIN STEM W/O DYE: CPT | Mod: 26

## 2018-05-09 PROCEDURE — 70450 CT HEAD/BRAIN W/O DYE: CPT | Mod: 26

## 2018-05-09 PROCEDURE — 72131 CT LUMBAR SPINE W/O DYE: CPT | Mod: 26

## 2018-05-09 RX ORDER — ASPIRIN/CALCIUM CARB/MAGNESIUM 324 MG
162 TABLET ORAL ONCE
Qty: 0 | Refills: 0 | Status: COMPLETED | OUTPATIENT
Start: 2018-05-09 | End: 2018-05-09

## 2018-05-09 RX ORDER — ATORVASTATIN CALCIUM 80 MG/1
20 TABLET, FILM COATED ORAL AT BEDTIME
Qty: 0 | Refills: 0 | Status: DISCONTINUED | OUTPATIENT
Start: 2018-05-09 | End: 2018-05-13

## 2018-05-09 RX ADMIN — HEPARIN SODIUM 5000 UNIT(S): 5000 INJECTION INTRAVENOUS; SUBCUTANEOUS at 15:24

## 2018-05-09 RX ADMIN — Medication 81 MILLIGRAM(S): at 11:09

## 2018-05-09 RX ADMIN — SODIUM CHLORIDE 70 MILLILITER(S): 9 INJECTION INTRAMUSCULAR; INTRAVENOUS; SUBCUTANEOUS at 04:30

## 2018-05-09 RX ADMIN — CEFTRIAXONE 1000 MILLIGRAM(S): 500 INJECTION, POWDER, FOR SOLUTION INTRAMUSCULAR; INTRAVENOUS at 18:19

## 2018-05-09 RX ADMIN — TRAMADOL HYDROCHLORIDE 25 MILLIGRAM(S): 50 TABLET ORAL at 05:59

## 2018-05-09 RX ADMIN — SENNA PLUS 1 TABLET(S): 8.6 TABLET ORAL at 21:56

## 2018-05-09 RX ADMIN — HEPARIN SODIUM 5000 UNIT(S): 5000 INJECTION INTRAVENOUS; SUBCUTANEOUS at 05:17

## 2018-05-09 RX ADMIN — Medication 162 MILLIGRAM(S): at 11:10

## 2018-05-09 RX ADMIN — ATORVASTATIN CALCIUM 20 MILLIGRAM(S): 80 TABLET, FILM COATED ORAL at 21:55

## 2018-05-09 RX ADMIN — Medication 180 MILLIGRAM(S): at 05:18

## 2018-05-09 RX ADMIN — Medication 650 MILLIGRAM(S): at 12:28

## 2018-05-09 RX ADMIN — Medication 1 TABLET(S): at 11:10

## 2018-05-09 RX ADMIN — PANTOPRAZOLE SODIUM 40 MILLIGRAM(S): 20 TABLET, DELAYED RELEASE ORAL at 05:17

## 2018-05-09 RX ADMIN — TRAMADOL HYDROCHLORIDE 25 MILLIGRAM(S): 50 TABLET ORAL at 23:04

## 2018-05-09 RX ADMIN — TRAMADOL HYDROCHLORIDE 25 MILLIGRAM(S): 50 TABLET ORAL at 15:25

## 2018-05-09 NOTE — PHYSICAL THERAPY INITIAL EVALUATION ADULT - MODALITIES TREATMENT COMMENTS
pt left seated in chair post Eval; chair alarm donned; IV intact; NA Regla present - feeding pt; callbell in reach; pt instructed not to get up alone; call nursing for assist; oscar well; denied pain; RN Jayshree made aware pt OOB

## 2018-05-09 NOTE — DIETITIAN INITIAL EVALUATION ADULT. - NS AS NUTRI DX NUTRIENT
Pt meets criteria for moderate protein calorie malnutrition in context of chronic illness/Malnutrition

## 2018-05-09 NOTE — PHYSICAL THERAPY INITIAL EVALUATION ADULT - LIGHT TOUCH SENSATION, RUE, REHAB EVAL
Patient would like to speak with MACY Tripathi regarding his visit pertaining to his leg issues. Please give patient a call with the status of his request.    Thanks      within normal limits

## 2018-05-09 NOTE — PROGRESS NOTE ADULT - ASSESSMENT
89 YO F with L knee OA  FU XR T/L spine- recommend spine consult if abnormal  Pain control  WBAT  Rest/ice/elevate  PT  No acute orthopedic surgical intervention   Follow up with Dr. Marte as outpatient as needed

## 2018-05-09 NOTE — PROGRESS NOTE ADULT - ASSESSMENT
· Assessment		  Pt is a  89 y/o woman with a PMHx of Diastolic CHF, unspecified type, HTN presents to the ED from [facility] s/p mechanical fall today. Pt states she was in her bathroom having a BM  and slipped off and fell.   She was then unable to get up due to b/l LE weakness, pain. No LOC. Unsure about head trauma. Pt reports left knee pain, right hip pain, weakness after the fall. Pt states she has ecchymosis below the right knee from fall. No fever or any other acute complaints at this time. Pt is ambulatory with a rollator walker at baseline. Pt is on ASA.   She denies fever/chills, cpain/SOB,  abd, UTI or URI symptoms.    She has chronic urinary incontinence.   No other complaints.  She has poor po intake today,  per daughter-in-law at bedside.  .   Admitted with mechanical fall and gneralised weakness and found to have UTI and YUNIOR    UA is positive and pt has leukocytosis, with increased neutrophils    Pt is admitted w/    I. UTI , complicated, vs sepsis of urinary origin ;  weakness and fall  - Rocephin started in the ER,  will cont  - follow ucx,   - blood cx were drawn  after antibiotics in ER  - physical therapy with rollator walkerI. ARF,  acute on CKD II- improving with IVF  - IVF  - will hold torsemide and altace for now until cr normalises  - follow renal function    # YUNIOR on CKD,(baseline cr 1.2 and 1.33) now with hyperchoremia and mild normal anion gap metabolic acidosis from normal saline use   will hold IVF as cr improved since admission  continue to hold altace and torsemide today and check BMP in am  if no further improvement consider renal consult    # left sided weakness time of onset unknown ( her left face weakness and left arm weakness could have been masked by her lethargy since admission)-likely subacute stroke  head Ct at time of admission neg   will reorder head CT and would do MRI/MRA brain and carotid  transfer to [facility]   statin  neuro consult  cardio consult    III. CHF, unspecified type chronic  - stable    IV. DVT prophylaxis  - heparin · Assessment		  Pt is a  89 y/o woman with a PMHx of Diastolic CHF, unspecified type, HTN presents to the ED from [facility] s/p mechanical fall today. Pt states she was in her bathroom having a BM  and slipped off and fell.   She was then unable to get up due to b/l LE weakness, pain. No LOC. Unsure about head trauma. Pt reports left knee pain, right hip pain, weakness after the fall. Pt states she has ecchymosis below the right knee from fall. No fever or any other acute complaints at this time. Pt is ambulatory with a rollator walker at baseline. Pt is on ASA.   She denies fever/chills, cpain/SOB,  abd, UTI or URI symptoms.    She has chronic urinary incontinence.   No other complaints.  She has poor po intake today,  per daughter-in-law at bedside.  .   Admitted with mechanical fall and gneralised weakness and found to have UTI and YUNIOR    UA is positive and pt has leukocytosis, with increased neutrophils    Pt is admitted w/    I. UTI , complicated, vs sepsis of urinary origin ;  weakness and fall  - Rocephin started in the ER,  will cont  - follow ucx,   - blood cx were drawn  after antibiotics in ER  - physical therapy with rollator walkerI.         # YUNIOR on CKstage 2,(baseline cr 1.2 and 1.33) now with hyperchoremia and mild normal anion gap metabolic acidosis from normal saline use   will hold IVF as cr improved since admission  continue to hold altace and torsemide today and check BMP in am  if no further improvement consider renal consult    # left sided weakness time of onset unknown ( her left face weakness and left arm weakness could have been masked by her lethargy since admission)-likely subacute stroke  head Ct at time of admission neg   will reorder head CT and would do MRI/MRA brain and carotid  transfer to [facility]   statin  neuro consult  cardio consult    III. CHF,diastolic chronic  - stable      IV. DVT prophylaxis  - heparin   discussed with daughter patricia · Assessment		  Pt is a  89 y/o woman with a PMHx of Diastolic CHF, unspecified type, HTN presents to the ED from [facility] s/p mechanical fall today. Pt states she was in her bathroom having a BM  and slipped off and fell.   She was then unable to get up due to b/l LE weakness, pain. No LOC. Unsure about head trauma. Pt reports left knee pain, right hip pain, weakness after the fall. Pt states she has ecchymosis below the right knee from fall. No fever or any other acute complaints at this time. Pt is ambulatory with a rollator walker at baseline. Pt is on ASA.   She denies fever/chills, cpain/SOB,  abd, UTI or URI symptoms.    She has chronic urinary incontinence.   No other complaints.  She has poor po intake today,  per daughter-in-law at bedside.  .   Admitted with mechanical fall and gneralised weakness and found to have UTI and YUNIOR    UA is positive and pt has leukocytosis, with increased neutrophils    Pt is admitted w/    I. UTI ,improving  weakness and fall  - Rocephin Day #3, would consider switch to po 5/10/18  - follow ucx,   - blood cx were drawn  after antibiotics in ER  - physical therapy with rollator walkerI.         # YUNIOR on CKstage 2,(baseline cr 1.2 and 1.33) now with hyperchoremia and mild normal anion gap metabolic acidosis from normal saline use   will hold IVF as cr improved since admission  continue to hold altace and torsemide today and check BMP in am  if no further improvement consider renal consult    # left sided weakness time of onset unknown ( her left face weakness and left arm weakness could have been masked by her lethargy since admission)-likely subacute stroke  head Ct at time of admission neg   will reorder head CT and would do MRI/MRA brain and carotid  transfer to [facility]   statin  neuro consult  cardio consult    III. CHF,diastolic chronic  - stable      IV. DVT prophylaxis  - heparin   discussed with daughter patricia · Assessment		  Pt is a  89 y/o woman with a PMHx of Diastolic CHF, unspecified type, HTN presents to the ED from [facility] s/p mechanical fall today. Pt states she was in her bathroom having a BM  and slipped off and fell.   She was then unable to get up due to b/l LE weakness, pain. No LOC. Unsure about head trauma. Pt reports left knee pain, right hip pain, weakness after the fall. Pt states she has ecchymosis below the right knee from fall. No fever or any other acute complaints at this time. Pt is ambulatory with a rollator walker at baseline. Pt is on ASA.   She denies fever/chills, cpain/SOB,  abd, UTI or URI symptoms.    She has chronic urinary incontinence.   No other complaints.  She has poor po intake today,  per daughter-in-law at bedside.  .   Admitted with mechanical fall and gneralised weakness and found to have UTI and YUNIOR    UA is positive and pt has leukocytosis, with increased neutrophils    Pt is admitted w/    I. UTI ecoli ,improving  weakness and fall  - Rocephin Day #3, would consider switch to po 5/10/18  - follow urine cx sensitivities  -blood cx negx2  - blood cx were drawn  after antibiotics in ER  - physical therapy with rollator walkerI.         # YUNIOR on CKstage 2,(baseline cr 1.2 and 1.33) now with hyperchoremia and mild normal anion gap metabolic acidosis from normal saline use   will hold IVF as cr improved since admission  continue to hold altace and torsemide today and check BMP in am  if no further improvement consider renal consult    # left sided weakness time of onset unknown ( her left face weakness and left arm weakness could have been masked by her lethargy since admission)-likely subacute stroke  head Ct at time of admission neg   will reorder head CT and would do MRI/MRA brain and carotid  transfer to tele  asa   statin  neuro consult  cardio consult    III. CHF,diastolic chronic  - stable      IV. DVT prophylaxis  - heparin   discussed with daughter patricia

## 2018-05-09 NOTE — DIETITIAN INITIAL EVALUATION ADULT. - OTHER INFO
Nutrition Assessment for PU stage II or greater: 89yoF admitted s/p mechanical fall at [facility], UTI. PMH includes CHF, HTN, CKD. 2+ edema to R/L ankles. Stage II PU to right buttocks. Myron 12. Pt is full code. Pt confused, no dx of dementia noted. Unable to provide info given cognitive deficit. Observed during breakfast, ate <25% of meal. Receiving low sodium diet rx. No chewing or swallowing difficulties. Per LPN at [facility], followed RAÚL diet PTA. Reported variable po intake at times. wt stable since 01/18, no wt loss. Wt in EMR is inaccurate, new wt obtained. BMI 19.4, appears thin. NFPE reveals moderate muscle wasting of temporal, clavicle, and calves. Mild fat loss of triceps. Pt meets criteria for moderate protein calorie malnutrition in context of chronic illness secondary to po intake meeting <75% estimated nutrtion needs at times, moderate muscle wasting, mild fat loss. Recommendations: 1) liberalize diet to regular, no therapeutic diet restrictions indicated 2) consider appetite stimulant 2) provide ensure enlive 4oz. BID 3) provide prosource 1x/day to help meet pro needs, wound healing. Will continue to monitor po intake, wt, labs. Nutrition Assessment for PU stage II or greater: 89yoF admitted s/p mechanical fall at [facility], UTI. PMH includes CHF, HTN, CKD. 2+ edema to R/L ankles. Stage II PU to right buttocks. Myron 12. Pt is full code. Pt confused, no dx of dementia noted. Unable to provide info given cognitive deficit. Observed during breakfast, ate <25% of meal. Receiving low sodium diet rx. No chewing or swallowing difficulties. Per LPN at [facility], followed RAÚL diet PTA. Reported variable po intake at times. wt stable since 01/18, no wt loss. Wt in EMR is inaccurate, new wt obtained. BMI 19.4, appears thin. NFPE reveals moderate muscle wasting of temporal, clavicle, and calves. Mild fat loss of triceps. Pt meets criteria for moderate protein calorie malnutrition in context of chronic illness secondary to po intake meeting <75% estimated nutrtion needs at times, moderate muscle wasting, mild fat loss. Recommendations: 1) liberalize diet to regular, no therapeutic diet restrictions indicated 2) consider appetite stimulant 3) provide ensure enlive 4oz. BID 4) provide prosource 1x/day to help meet pro needs, wound healing. Will continue to monitor po intake, wt, labs.

## 2018-05-09 NOTE — CONSULT NOTE ADULT - PROBLEM SELECTOR RECOMMENDATION 9
Neurologic deficits are suggestive of possible CVA; neurology evaluation / brain imaging; observe on telemetry.

## 2018-05-09 NOTE — DIETITIAN INITIAL EVALUATION ADULT. - ENERGY NEEDS
Ht.      64"        Wt.    112.5#            19.4  BMI                  IBW    120#               Pt is at    94%  IBW

## 2018-05-09 NOTE — PHYSICAL THERAPY INITIAL EVALUATION ADULT - MANUAL MUSCLE TESTING RESULTS, REHAB EVAL
except L UE: shld FE 3-/5; elbow flex 4-/5; ext 3+/5;  4-/5; L LE: hip/knee flex 2+/5; knee ext 1/5; ankle PF/DF not observed/no strength deficits were identified

## 2018-05-09 NOTE — PHYSICAL THERAPY INITIAL EVALUATION ADULT - GENERAL OBSERVATIONS, REHAB EVAL
IV; pt rec'd in bed supine; L knee flexed; L facial droop noted; RN Jayshree made aware; pt denied pain

## 2018-05-09 NOTE — PHYSICAL THERAPY INITIAL EVALUATION ADULT - CRITERIA FOR SKILLED THERAPEUTIC INTERVENTIONS
impairments found/JIMENA Martell made aware of L facial droop / L sided weakness - placed call to Dr. Sifuentes

## 2018-05-09 NOTE — CONSULT NOTE ADULT - PROBLEM/RECOMMENDATION-1
DISPLAY PLAN FREE TEXT
Patient O2 89%
patients urine output from 6pm-8pm 10 cc.
pt's urine output from 2pm- 6pm is 100cc.

## 2018-05-10 LAB
ANION GAP SERPL CALC-SCNC: 9 MMOL/L — SIGNIFICANT CHANGE UP (ref 5–17)
BASOPHILS # BLD AUTO: 0.02 K/UL — SIGNIFICANT CHANGE UP (ref 0–0.2)
BASOPHILS NFR BLD AUTO: 0.2 % — SIGNIFICANT CHANGE UP (ref 0–2)
BUN SERPL-MCNC: 39 MG/DL — HIGH (ref 7–23)
CALCIUM SERPL-MCNC: 8.9 MG/DL — SIGNIFICANT CHANGE UP (ref 8.5–10.1)
CHLORIDE SERPL-SCNC: 113 MMOL/L — HIGH (ref 96–108)
CHOLEST SERPL-MCNC: 152 MG/DL — SIGNIFICANT CHANGE UP (ref 10–199)
CO2 SERPL-SCNC: 22 MMOL/L — SIGNIFICANT CHANGE UP (ref 22–31)
CREAT SERPL-MCNC: 1.58 MG/DL — HIGH (ref 0.5–1.3)
EOSINOPHIL # BLD AUTO: 0.01 K/UL — SIGNIFICANT CHANGE UP (ref 0–0.5)
EOSINOPHIL NFR BLD AUTO: 0.1 % — SIGNIFICANT CHANGE UP (ref 0–6)
GLUCOSE SERPL-MCNC: 102 MG/DL — HIGH (ref 70–99)
HCT VFR BLD CALC: 37.4 % — SIGNIFICANT CHANGE UP (ref 34.5–45)
HDLC SERPL-MCNC: 59 MG/DL — SIGNIFICANT CHANGE UP (ref 40–125)
HGB BLD-MCNC: 11.7 G/DL — SIGNIFICANT CHANGE UP (ref 11.5–15.5)
IMM GRANULOCYTES NFR BLD AUTO: 0.6 % — SIGNIFICANT CHANGE UP (ref 0–1.5)
LIPID PNL WITH DIRECT LDL SERPL: 75 MG/DL — SIGNIFICANT CHANGE UP
LYMPHOCYTES # BLD AUTO: 0.76 K/UL — LOW (ref 1–3.3)
LYMPHOCYTES # BLD AUTO: 6.3 % — LOW (ref 13–44)
MCHC RBC-ENTMCNC: 26.6 PG — LOW (ref 27–34)
MCHC RBC-ENTMCNC: 31.3 GM/DL — LOW (ref 32–36)
MCV RBC AUTO: 85 FL — SIGNIFICANT CHANGE UP (ref 80–100)
MONOCYTES # BLD AUTO: 1.04 K/UL — HIGH (ref 0–0.9)
MONOCYTES NFR BLD AUTO: 8.6 % — SIGNIFICANT CHANGE UP (ref 2–14)
NEUTROPHILS # BLD AUTO: 10.22 K/UL — HIGH (ref 1.8–7.4)
NEUTROPHILS NFR BLD AUTO: 84.2 % — HIGH (ref 43–77)
NRBC # BLD: 0 /100 WBCS — SIGNIFICANT CHANGE UP (ref 0–0)
PLATELET # BLD AUTO: 271 K/UL — SIGNIFICANT CHANGE UP (ref 150–400)
POTASSIUM SERPL-MCNC: 4.6 MMOL/L — SIGNIFICANT CHANGE UP (ref 3.5–5.3)
POTASSIUM SERPL-SCNC: 4.6 MMOL/L — SIGNIFICANT CHANGE UP (ref 3.5–5.3)
RBC # BLD: 4.4 M/UL — SIGNIFICANT CHANGE UP (ref 3.8–5.2)
RBC # FLD: 17.2 % — HIGH (ref 10.3–14.5)
SODIUM SERPL-SCNC: 144 MMOL/L — SIGNIFICANT CHANGE UP (ref 135–145)
TOTAL CHOLESTEROL/HDL RATIO MEASUREMENT: 2.6 RATIO — LOW (ref 3.3–7.1)
TRIGL SERPL-MCNC: 90 MG/DL — SIGNIFICANT CHANGE UP (ref 10–149)
WBC # BLD: 12.12 K/UL — HIGH (ref 3.8–10.5)
WBC # FLD AUTO: 12.12 K/UL — HIGH (ref 3.8–10.5)

## 2018-05-10 PROCEDURE — 99233 SBSQ HOSP IP/OBS HIGH 50: CPT

## 2018-05-10 PROCEDURE — 93306 TTE W/DOPPLER COMPLETE: CPT | Mod: 26

## 2018-05-10 RX ORDER — MEROPENEM 1 G/30ML
1000 INJECTION INTRAVENOUS EVERY 12 HOURS
Qty: 0 | Refills: 0 | Status: DISCONTINUED | OUTPATIENT
Start: 2018-05-10 | End: 2018-05-13

## 2018-05-10 RX ORDER — MEROPENEM 1 G/30ML
500 INJECTION INTRAVENOUS ONCE
Qty: 0 | Refills: 0 | Status: COMPLETED | OUTPATIENT
Start: 2018-05-10 | End: 2018-05-10

## 2018-05-10 RX ADMIN — Medication 100 MILLIGRAM(S): at 12:09

## 2018-05-10 RX ADMIN — SENNA PLUS 1 TABLET(S): 8.6 TABLET ORAL at 22:38

## 2018-05-10 RX ADMIN — HEPARIN SODIUM 5000 UNIT(S): 5000 INJECTION INTRAVENOUS; SUBCUTANEOUS at 22:37

## 2018-05-10 RX ADMIN — Medication 1 TABLET(S): at 12:08

## 2018-05-10 RX ADMIN — Medication 81 MILLIGRAM(S): at 12:08

## 2018-05-10 RX ADMIN — HEPARIN SODIUM 5000 UNIT(S): 5000 INJECTION INTRAVENOUS; SUBCUTANEOUS at 06:24

## 2018-05-10 RX ADMIN — HEPARIN SODIUM 5000 UNIT(S): 5000 INJECTION INTRAVENOUS; SUBCUTANEOUS at 12:08

## 2018-05-10 RX ADMIN — MEROPENEM 100 MILLIGRAM(S): 1 INJECTION INTRAVENOUS at 14:49

## 2018-05-10 RX ADMIN — ATORVASTATIN CALCIUM 20 MILLIGRAM(S): 80 TABLET, FILM COATED ORAL at 22:37

## 2018-05-10 RX ADMIN — Medication 650 MILLIGRAM(S): at 18:18

## 2018-05-10 RX ADMIN — Medication 650 MILLIGRAM(S): at 18:32

## 2018-05-10 NOTE — SWALLOW BEDSIDE ASSESSMENT ADULT - ASR SWALLOW LINGUAL MOBILITY
Lingua ROM/strength/agility were mildly to moderately reduced on the left with a left tongue drift. Lingual ROM/strength/agility were mildly to moderately reduced on the left with a left tongue drift.

## 2018-05-10 NOTE — SWALLOW BEDSIDE ASSESSMENT ADULT - PHARYNGEAL PHASE
Swallowing trigger was timely to slightly latent, Laryngeal lift on palpation during swallowing trials was mildly+ reduced but felt to be functional with some modified food textures. Occasional post prandial coughing was demonstrated with thin liquids only despite attempts to employ compensatory swallowing maneuvers. No overt aspiration signs were demonstrated with nectar thick liquids, pureed foods and mechanical soft solids.

## 2018-05-10 NOTE — SWALLOW BEDSIDE ASSESSMENT ADULT - ORAL PHASE
Bolus formation/transfer were mildly to moderately prolonged but mechanically functional. Piecemeal deglutition was evident. Mild oral debris noted on the left with mechanical soft solids only.

## 2018-05-10 NOTE — SWALLOW BEDSIDE ASSESSMENT ADULT - SWALLOW EVAL: RECOMMENDED FEEDING/EATING TECHNIQUES
check mouth frequently for oral residue/pocketing/small sips/bites/maintain upright posture during/after eating for 30 mins/crush medication (when feasible)

## 2018-05-10 NOTE — PROGRESS NOTE ADULT - ASSESSMENT
Pt is a  89 y/o woman with a PMHx of Diastolic CHF, unspecified type, HTN presents to the ED from [facility] s/p mechanical fall. Pt states she was in her bathroom having a BM  and slipped off and fell.   She was then unable to get up due to b/l LE weakness, pain. No LOC.  Pt reported left knee pain, right hip pain, weakness after the fall. Pt states she has ecchymosis below the right knee from fall.  Pt is ambulatory with a rollator walker at baseline. Pt is on ASA.      Admitted with mechanical fall and generalised weakness and found to have ESBL UTI and Acute CVA.    # UTI with MDRO/ESBL UTI  - Initially got 3 days rocephin, now switched to Meropenem # 1   - blood cx negx2  - blood cx were drawn  after antibiotics in ER  - ID consult for abx mgmt    # YUNIOR on CKstage 2,(baseline cr 1.2 and 1.33) now with hyperchoremia and mild normal anion gap metabolic acidosis from normal saline use   will hold IVF as cr improved since admission  continue to hold altace and torsemide today  if no further improvement consider renal consult    # left sided weakness time of onset unknown 2/2 acute ischemic stroke  head Ct at time of admission neg  MRI/A c/w acute cva 2/2 poor cerebrovascular circulation  cw tele  permissive htn especially in setting of poor cerebrovascular circulation  asa   statin  neuro consult  cardio consult  may need neurovascular intervention? will d/w neuro.    # CHF,diastolic chronic  - stable    # DVT prophylaxis  - heparin

## 2018-05-10 NOTE — SWALLOW BEDSIDE ASSESSMENT ADULT - COMMENTS
The patient was admitted to  from [facility] status post fall. Hospital course is notable for ESBL UTI, left knee pain, left sided weakness, left facial droop and slurred speech. Imaging was notable for right internal capsule/frontal infarcts, lumbar fractures and rib fractures. This profile is superimposed upon a history of diastolic CHF and hypertension.

## 2018-05-10 NOTE — PROGRESS NOTE ADULT - ASSESSMENT
Assessment and Recommendation:   Problem/Recommendation - 1:  Problem: Stroke. Recommendation:  possible CVA; . telemetry monitoring  - sinus rhythm. She is lethargic today ( received Ultram last night). Neurology f./u pending.     Problem/Recommendation - 2:  ·  Problem: Chronic diastolic (congestive) heart failure.  Recommendation: Appears compensated with clear lungs and no dyspnea.     Problem/Recommendation - 3:  ·  Problem: Pericardial effusion.  Recommendation: Echo to reassess previously described pericardial effusion; pending    Problem/Recommendation - 4:  ·  Problem: Essential hypertension.  Recommendation: only mildly elevated BP presently.   continue diltiazem . Adjust medication as required ( avoid hypotension).

## 2018-05-10 NOTE — PROGRESS NOTE ADULT - ASSESSMENT
· Assessment		  Pt is a  89 y/o woman with a PMHx of Diastolic CHF, unspecified type, HTN presents to the ED from [facility] s/p mechanical fall today. Pt states she was in her bathroom having a BM  and slipped off and fell noted to have left sided weakness time of onset unknown    likely subacute stroke Etiology Ischemic.   C/o Pain left LE R/o Lumbar compression Fx with radiculopathy  Rec MRI brain acute infarcts Rt frontal and  Rt Internal capsule  CT /MRI Ls spine. old compression Fx/ rib FX multiple.  Pain management but avoid oversedation.  Correct underlying Metabolic causes.  Discussed with Pt, her son and D- in law and Dr. Sifuentes yesterday will try to call today  Will follow .  PT/OT when stable.  ASA/statin low dose.  Rx UTI.

## 2018-05-10 NOTE — CHART NOTE - NSCHARTNOTEFT_GEN_A_CORE
Rosey Nash            Upon Nutritional Assessment by the Registered Dietitian your patient was determined to meet criteria / has evidence of the following diagnosis/diagnoses:          [ ]  Mild Protein Calorie Malnutrition        [ ]  Moderate Protein Calorie Malnutrition        [x ] Severe Protein Calorie Malnutrition        [ ] Unspecified Protein Calorie Malnutrition        [ ] Underweight / BMI <19        [ ] Morbid Obesity / BMI > 40      Findings as based on:  •  Comprehensive nutrition assessment and consultation  •  Calorie counts (nutrient intake analysis)  •  Food acceptance and intake status from observations by staff  •  Follow up  •  Patient education  •  Intervention secondary to interdisciplinary rounds  •   concerns    Pt at risk for severe protein-calorie malnutrition  NFPE reveals severe muscle wasting (temporal, clavicle, scapula),   moderate muscle wasting (calves, quads)  moderate/severe fat depletion (ribs, triceps)  PO intake < 75% needs > one month    Treatment:    The following diet has been recommended:  1) liberalize diet to regular, no therapeutic diet restrictions indicated   2) consider appetite stimulant   3) provide ensure enlive 4oz. BID   4) provide prosource 1x/day to help meet pro needs, wound healing.   PROVIDER Section:     By signing this assessment you are acknowledging and agree with the diagnosis/diagnoses assigned by the Registered Dietitian    Comments:

## 2018-05-10 NOTE — SWALLOW BEDSIDE ASSESSMENT ADULT - SWALLOW EVAL: DIAGNOSIS
1) The patient demonstrates feeding compromise in setting of LUE weakness/left visual neglect atop neurogenic Oropharyngeal Dysphagia which subjectively appeared to be a functional condition with a restricted inventory of modified food textures. Overt aspiration signs were exhibited with thin liquids only. Above in setting of acute right CVA.   2) Pt demonstrates acute post stroke Dysarthria due to left sided oral motor weakness with underlying linguistic preservation.

## 2018-05-11 ENCOUNTER — TRANSCRIPTION ENCOUNTER (OUTPATIENT)
Age: 83
End: 2018-05-11

## 2018-05-11 PROCEDURE — 99232 SBSQ HOSP IP/OBS MODERATE 35: CPT

## 2018-05-11 PROCEDURE — 71045 X-RAY EXAM CHEST 1 VIEW: CPT | Mod: 26

## 2018-05-11 RX ORDER — SENNA PLUS 8.6 MG/1
1 TABLET ORAL
Qty: 0 | Refills: 0 | COMMUNITY

## 2018-05-11 RX ORDER — DILTIAZEM HCL 120 MG
1 CAPSULE, EXT RELEASE 24 HR ORAL
Qty: 0 | Refills: 0 | COMMUNITY
Start: 2018-05-11

## 2018-05-11 RX ORDER — POTASSIUM CHLORIDE 20 MEQ
1 PACKET (EA) ORAL
Qty: 0 | Refills: 0 | COMMUNITY

## 2018-05-11 RX ORDER — DOCUSATE SODIUM 100 MG
2 CAPSULE ORAL
Qty: 0 | Refills: 0 | COMMUNITY

## 2018-05-11 RX ORDER — FUROSEMIDE 40 MG
40 TABLET ORAL ONCE
Qty: 0 | Refills: 0 | Status: COMPLETED | OUTPATIENT
Start: 2018-05-11 | End: 2018-05-11

## 2018-05-11 RX ORDER — ASPIRIN/CALCIUM CARB/MAGNESIUM 324 MG
1 TABLET ORAL
Qty: 0 | Refills: 0 | COMMUNITY

## 2018-05-11 RX ORDER — PANTOPRAZOLE SODIUM 20 MG/1
1 TABLET, DELAYED RELEASE ORAL
Qty: 0 | Refills: 0 | COMMUNITY

## 2018-05-11 RX ORDER — MEROPENEM 1 G/30ML
1000 INJECTION INTRAVENOUS ONCE
Qty: 0 | Refills: 0 | Status: COMPLETED | OUTPATIENT
Start: 2018-05-11 | End: 2018-05-11

## 2018-05-11 RX ORDER — DILTIAZEM HCL 120 MG
1 CAPSULE, EXT RELEASE 24 HR ORAL
Qty: 0 | Refills: 0 | COMMUNITY

## 2018-05-11 RX ORDER — ATORVASTATIN CALCIUM 80 MG/1
1 TABLET, FILM COATED ORAL
Qty: 0 | Refills: 0 | COMMUNITY
Start: 2018-05-11

## 2018-05-11 RX ORDER — FUROSEMIDE 40 MG
20 TABLET ORAL ONCE
Qty: 0 | Refills: 0 | Status: COMPLETED | OUTPATIENT
Start: 2018-05-12 | End: 2018-05-12

## 2018-05-11 RX ADMIN — Medication 40 MILLIGRAM(S): at 17:18

## 2018-05-11 RX ADMIN — ATORVASTATIN CALCIUM 20 MILLIGRAM(S): 80 TABLET, FILM COATED ORAL at 21:27

## 2018-05-11 RX ADMIN — Medication 81 MILLIGRAM(S): at 13:26

## 2018-05-11 RX ADMIN — TRAMADOL HYDROCHLORIDE 25 MILLIGRAM(S): 50 TABLET ORAL at 16:52

## 2018-05-11 RX ADMIN — MEROPENEM 100 MILLIGRAM(S): 1 INJECTION INTRAVENOUS at 02:39

## 2018-05-11 RX ADMIN — HEPARIN SODIUM 5000 UNIT(S): 5000 INJECTION INTRAVENOUS; SUBCUTANEOUS at 21:27

## 2018-05-11 RX ADMIN — Medication 1 TABLET(S): at 13:27

## 2018-05-11 RX ADMIN — SENNA PLUS 1 TABLET(S): 8.6 TABLET ORAL at 21:28

## 2018-05-11 RX ADMIN — Medication 100 MILLIGRAM(S): at 13:27

## 2018-05-11 RX ADMIN — HEPARIN SODIUM 5000 UNIT(S): 5000 INJECTION INTRAVENOUS; SUBCUTANEOUS at 16:51

## 2018-05-11 RX ADMIN — Medication 650 MILLIGRAM(S): at 13:28

## 2018-05-11 RX ADMIN — MEROPENEM 100 MILLIGRAM(S): 1 INJECTION INTRAVENOUS at 17:19

## 2018-05-11 RX ADMIN — Medication 650 MILLIGRAM(S): at 03:24

## 2018-05-11 RX ADMIN — PANTOPRAZOLE SODIUM 40 MILLIGRAM(S): 20 TABLET, DELAYED RELEASE ORAL at 04:22

## 2018-05-11 RX ADMIN — TRAMADOL HYDROCHLORIDE 25 MILLIGRAM(S): 50 TABLET ORAL at 15:21

## 2018-05-11 RX ADMIN — Medication 650 MILLIGRAM(S): at 17:27

## 2018-05-11 RX ADMIN — HEPARIN SODIUM 5000 UNIT(S): 5000 INJECTION INTRAVENOUS; SUBCUTANEOUS at 04:22

## 2018-05-11 RX ADMIN — MEROPENEM 100 MILLIGRAM(S): 1 INJECTION INTRAVENOUS at 08:40

## 2018-05-11 NOTE — PROGRESS NOTE ADULT - ASSESSMENT
Pt is a  89 y/o woman with a PMHx of Diastolic CHF, unspecified type, HTN presents to the ED from [facility] s/p mechanical fall. Pt states she was in her bathroom having a BM  and slipped off and fell.   She was then unable to get up due to b/l LE weakness, pain. No LOC.  Pt reported left knee pain, right hip pain, weakness after the fall. Pt states she has ecchymosis below the right knee from fall.  Pt is ambulatory with a rollator walker at baseline. Pt is on ASA.      Admitted with mechanical fall and generalized weakness and found to have ESBL UTI and Acute CVA.    # UTI with MDRO/ESBL UTI  - Initially got 3 days rocephin, now switched to Meropenem # 2.  Will plan for Ertapenem 500mg once daily for 3 more days via PIV upon discharge to [facility].  - blood cx negx2  - blood cx were drawn after antibiotics in ER  - ID consult appreciated    # YUNIOR on CKstage 2,(baseline cr 1.2 and 1.33) - pt near BL  will hold IVF as cr improved since admission  continue to hold altace and torsemide at [place] and can be restarted at [facility] depending on clinical course.  if no further improvement consider renal consult    # left sided weakness time of onset unknown 2/2 acute ischemic stroke  head Ct at time of admission neg  MRI/A c/w acute cva likely 2/2 either embolic etio or cerebrovascular atherosclerotic disease  Pt poor candidate for AC   contineu aspirin and statin  cw tele    # CHF,diastolic chronic  - stable    # DVT prophylaxis  - heparin    Discussed with Dr. Lopez     Time spent on discharge 35 minutes Pt is a  89 y/o woman with a PMHx of Diastolic CHF, unspecified type, HTN presents to the ED from [facility] s/p mechanical fall. Pt states she was in her bathroom having a BM  and slipped off and fell.   She was then unable to get up due to b/l LE weakness, pain. No LOC.  Pt reported left knee pain, right hip pain, weakness after the fall. Pt states she has ecchymosis below the right knee from fall.  Pt is ambulatory with a rollator walker at baseline. Pt is on ASA.      Admitted with mechanical fall and generalized weakness and found to have ESBL UTI and Acute CVA.    # UTI with MDRO/ESBL UTI  - Initially got 3 days rocephin, now switched to Meropenem # 2.  Will plan for Ertapenem 500mg once daily for 3 more days via PIV upon discharge to [facility].  - blood cx negx2  - blood cx were drawn after antibiotics in ER  - ID consult appreciated    # YUNIOR on CKstage 2,(baseline cr 1.2 and 1.33) - pt near BL  will hold IVF as cr improved since admission  continue to hold altace and torsemide at [place] and can be restarted at [facility] depending on clinical course.  if no further improvement consider renal consult    # left sided weakness time of onset unknown 2/2 acute ischemic stroke  head Ct at time of admission neg  MRI/A c/w acute cva likely 2/2 either embolic etio or cerebrovascular atherosclerotic disease  Pt poor candidate for AC d/t high risk of falls/bleeding  contineu aspirin and statin  cw tele    # CHF,diastolic chronic  - stable. cw ACEI. decrease torsemide frequency for now, can be adjusted as needed depending on clinical status. outpt f/u.    # Htn  - dilt, altase  - avoid hypotension    Discussed with Dr. Lopez , Dr. Ji

## 2018-05-11 NOTE — PROGRESS NOTE ADULT - ASSESSMENT
Assessment and Recommendation:   Problem/Recommendation - 1:  Problem: Stroke. Recommendation:  possible CVA; . telemetry monitoring  - sinus rhythm on tele and no atrial; Fib.  Trf. to SNF/Rehab today.     Problem/Recommendation - 2:  ·  Problem: Chronic diastolic (congestive) heart failure.  Recommendation: Stable and compensated. Related to valvular heart disease as well.     Problem/Recommendation - 3:  ·  Problem: Pericardial effusion.  Resolved on current echo.    Problem/Recommendation - 4:  ·  Problem: Essential hypertension.  Recommendation: only mildly elevated BP presently.   continue diltiazem . Reassess at rehab if needs further adjustment as do not want to make hypotensive.

## 2018-05-11 NOTE — DISCHARGE NOTE ADULT - CARE PLAN
Principal Discharge DX:	Stroke  Goal:	rehab  Assessment and plan of treatment:	take aspirin and lipitor  rehab  follow up with neurologist and cardiologist  Secondary Diagnosis:	Urinary tract infection with hematuria, site unspecified  Assessment and plan of treatment:	ertapenem three more days  Secondary Diagnosis:	Essential hypertension  Assessment and plan of treatment:	blood pressure medications adjusted in hospital.  May need further adjustment after discharge. Principal Discharge DX:	Stroke  Goal:	rehab  Assessment and plan of treatment:	take aspirin and lipitor  rehab  follow up with neurologist and cardiologist  Secondary Diagnosis:	Urinary tract infection with hematuria, site unspecified  Assessment and plan of treatment:	ertapenem 1 more day  Secondary Diagnosis:	Essential hypertension  Assessment and plan of treatment:	blood pressure medications adjusted in hospital.  May need further adjustment after discharge.  Secondary Diagnosis:	Knee contracture, left  Assessment and plan of treatment:	MRI of knee to be followed up after discharge

## 2018-05-11 NOTE — DISCHARGE NOTE ADULT - PATIENT PORTAL LINK FT
You can access the [facility] Patient Portal, offered by [facility], by registering with the following website: [email or web address]

## 2018-05-11 NOTE — PROVIDER CONTACT NOTE (OTHER) - SITUATION
Pt found to have crackles in lungs. Moist nonproductive cough. Pt on aspiration precautions post CVA.

## 2018-05-11 NOTE — DISCHARGE NOTE ADULT - MEDICATION SUMMARY - MEDICATIONS TO STOP TAKING
I will STOP taking the medications listed below when I get home from the hospital:    Lasix 40 mg oral tablet  -- 1 tab(s) by mouth every other day

## 2018-05-11 NOTE — CONSULT NOTE ADULT - CONSULT REASON
Acute CVA left side weakness
left knee contracture
uti
Called this AM to see patient for possible CVA

## 2018-05-11 NOTE — CONSULT NOTE ADULT - ASSESSMENT
Pt is a  89 y/o woman with a PMHx of Diastolic CHF, unspecified type, HTN, urinary incontinence admitted on 5/7 for evaluation of fall; found to have new CVA; urine culture from admission growing ESBL E coli; history per medical record as patient unable to provide history.  1. Patient found to have ESBL E coli urinary tract infection   - started on meropenem  - okay from id standpoint to discharge to snf on 3 more days of ertapenem 500 mg q day via peripheral ivl  - isolation will discontinue upon discharge  2. other issues:  Diastolic CHF, unspecified type, HTN,  - per medicine
89F with left knee tricompartmental OA    P:  pain control   cold compress  therapy   WBAT   no acute orthopedic intervention   discussed with attending on call Dr Marte, follow up as needed
Pt is a  89 y/o woman with a PMHx of Diastolic CHF, unspecified type, HTN presents to the ED from [facility] s/p mechanical fall today. Pt states she was in her bathroom having a BM  and slipped off and fell noted to have left sided weakness time of onset unknown    likely subacute stroke Etiology Ischemic.   C/o Pain left LE R/o Lumbar compression Fx with radiculopathy  Rec MRI brain  CT /MRI Ls spine.  Pain management but avoid oversedation.  Correct underlying Metabolic causes.  Consider EEG   Discussed with Pt, her son and D- in law and Dr. Sifuentes.  Will follow .

## 2018-05-11 NOTE — DISCHARGE NOTE ADULT - CARE PROVIDERS DIRECT ADDRESSES
[email or web address]

## 2018-05-11 NOTE — CHART NOTE - NSCHARTNOTEFT_GEN_A_CORE
Discharge cancelled. Pt desat to upper 80's.  course cough.  Rhonchi mostly in R base. suspecting aspiration pneumonia.  Will continue with antibiotics as ordered. Obtain CXR to evaluate for pneumonia or fluid overload.  Will f/u.  Possibly weekend Dc to [facility] if possible. Discharge cancelled. Pt desat to upper 80's.      XR c/w fluid overload.       A/P  # Acute hypoxic resp failure likely 2/2 pulmonary vascular congestion.  Less likly aspiration pneumonia.  Will give lasix 40 IV x 1 dose today.  Tomorrow Am 20 mg IV once.  Then restart home torsemide at 5mg daily starting Sunday.  Will give additional as needed.  aspiration precautions  continue meropenem until discharge  re-eval after diuresis

## 2018-05-11 NOTE — DISCHARGE NOTE ADULT - HOSPITAL COURSE
Pt is a  89 y/o woman with a PMHx of Diastolic CHF, unspecified type, HTN presents to the ED from [facility] s/p mechanical fall. Pt states she was in her bathroom having a BM  and slipped off and fell.   She was then unable to get up due to b/l LE weakness, pain. No LOC.  Pt reported left knee pain, right hip pain, weakness after the fall. Pt states she has ecchymosis below the right knee from fall.  Pt is ambulatory with a rollator walker at baseline. Pt is on ASA.      Admitted with mechanical fall and generalized weakness and found to have ESBL UTI and Acute CVA.    # UTI with MDRO/ESBL UTI  - Initially got 3 days rocephin, now switched to Meropenem # 2.  Will plan for Ertapenem 500mg once daily for 3 more days via PIV upon discharge to [facility].  - blood cx negx2  - blood cx were drawn after antibiotics in ER  - ID consult appreciated    # YUNIOR on CKstage 2,(baseline cr 1.2 and 1.33) - pt near BL  will hold IVF as cr improved since admission  continue to hold altace and torsemide at [place] and can be restarted at [facility] depending on clinical course.  if no further improvement consider renal consult    # left sided weakness time of onset unknown 2/2 acute ischemic stroke  head Ct at time of admission neg  MRI/A c/w acute cva likely 2/2 either embolic etio or cerebrovascular atherosclerotic disease  Pt poor candidate for AC d/t high risk of falls/bleeding  contineu aspirin and statin  cw tele    # CHF,diastolic chronic  - stable. cw ACEI. decrease torsemide frequency for now, can be adjusted as needed depending on clinical status. outpt f/u.    # Htn  - dilt, altase  - avoid hypotension    Discussed with Dr. Lopez , Dr. Ji    Time spent on discharge 50 minutes Pt is a  89 y/o woman with a PMHx of Diastolic CHF, unspecified type, HTN presents to the ED from [facility] s/p mechanical fall. Pt states she was in her bathroom having a BM  and slipped off and fell.   She was then unable to get up due to b/l LE weakness, pain. No LOC.  Pt reported left knee pain, right hip pain, weakness after the fall. Pt states she has ecchymosis below the right knee from fall.  Pt is ambulatory with a rollator walker at baseline. Pt is on ASA.      Admitted with mechanical fall and generalized weakness and found to have ESBL UTI and Acute CVA.    # UTI with MDRO/ESBL UTI  - Initially got 3 days rocephin, was switched to Meropenem # 4/5.  Will plan for Ertapenem 500mg once daily for 1 more day to complete course at [facility].  - blood cx negx2  - blood cx were drawn after antibiotics in ER  - ID consult appreciated    # Acute hypoxic resp failure- likely 2/2 pulmonary vascular congestion.  Less likly aspiration pneumonia.- resolved  - diuresis. restarted daily torsemide.  - aspiration precautions    # L Knee contraction- since admission. Per family did not have prior to fall.   - Ortho re-eval noted.  No indication for immobilization or splint.  - Will obtain MR knee to eval for any injury. To be followed up after discharge.    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  will give cardizem for now.  - c/w home dose torsemide  - Creat to be monitored at rehab.  Pts own primary to follow there.    # left sided weakness time of onset unknown 2/2 acute ischemic stroke  - head Ct at time of admission neg  - MRI/A c/w acute cva likely 2/2 either embolic etio or cerebrovascular atherosclerotic disease  - Pt poor candidate for AC d/t high risk of falls/bleeding  - continue aspirin and statin  - cw tele    # CHF,diastolic chronic  - stable. c/w torsemide. continue holding ramipril d/t YUNIOR.    # Htn  - cardizem, torsemide  - avoid hypotension    Dispo: MRI then dc to rehab. result to be followed after discharge and pt to f/u with her doctors at [facility].  Plan was coordinated with pts daughter in law who is a rheumatologist at [facility].    time spent on discharge 35 minutes

## 2018-05-11 NOTE — DISCHARGE NOTE ADULT - MEDICATION SUMMARY - MEDICATIONS TO TAKE
I will START or STAY ON the medications listed below when I get home from the hospital:    aspirin 81 mg oral tablet  -- 1 tab(s) by mouth once a day  -- Indication: For Stroke prevention    Altace 5 mg oral capsule  -- 1 cap(s) by mouth once a day  -- Indication: For blood pressure    dilTIAZem 60 mg oral tablet  -- 1 tab(s) by mouth every 8 hours  -- Indication: For blood pressure    atorvastatin 20 mg oral tablet  -- 1 tab(s) by mouth once a day (at bedtime)  -- Indication: For Cholesterol, stroke prevention    ertapenem  -- 500 milligram(s) injectable once a day  -- Indication: For UTI    torsemide 5 mg oral tablet  -- 1 tab(s) by mouth every 3 days  -- Indication: For volume, blood pressure    Colace 100 mg oral capsule  -- 2 cap(s) by mouth once a day (at bedtime)  -- Indication: For bowel regimen    Senna 8.6 mg oral tablet  -- 1 tab(s) by mouth once a day (at bedtime)  -- Indication: For bowel regimen    potassium chloride 10 mEq oral tablet, extended release  -- 1 tab(s) by mouth once a day  -- Indication: For replacement    Protonix 40 mg oral delayed release tablet  -- 1 tab(s) by mouth once a day  -- Indication: For Ppi    multivitamin  -- Indication: For vitamin I will START or STAY ON the medications listed below when I get home from the hospital:    aspirin 81 mg oral tablet  -- 1 tab(s) by mouth once a day  -- Indication: For Stroke prevention    acetaminophen 325 mg oral tablet  -- 2 tab(s) by mouth every 6 hours, As needed, Mild Pain (1 - 3)  -- Indication: For Pain    dilTIAZem 60 mg oral tablet  -- 1 tab(s) by mouth every 8 hours  -- Indication: For blood pressure    atorvastatin 20 mg oral tablet  -- 1 tab(s) by mouth once a day (at bedtime)  -- Indication: For Cholesterol, stroke prevention    ertapenem  -- 500 milligram(s) injectable once a day  -- Indication: For UTI    torsemide 5 mg oral tablet  -- 1 tab(s) by mouth every 3 days  -- Indication: For volume, blood pressure    Colace 100 mg oral capsule  -- 2 cap(s) by mouth once a day (at bedtime)  -- Indication: For bowel regimen    Senna 8.6 mg oral tablet  -- 1 tab(s) by mouth once a day (at bedtime)  -- Indication: For bowel regimen    potassium chloride 10 mEq oral tablet, extended release  -- 1 tab(s) by mouth once a day  -- Indication: For replacement    Protonix 40 mg oral delayed release tablet  -- 1 tab(s) by mouth once a day  -- Indication: For Ppi    multivitamin  -- Indication: For vitamin

## 2018-05-11 NOTE — PROGRESS NOTE ADULT - ASSESSMENT
· Assessment		  Pt is a  89 y/o woman with a PMHx of Diastolic CHF, unspecified type, HTN presents to the ED from [facility] s/p mechanical fall today. Pt states she was in her bathroom having a BM  and slipped off and fell noted to have left sided weakness time of onset unknown    acute CVA  prob embolic with occludrd ICA/MCA intracranially.   C/o Pain left LE R/o Lumbar compression Fx with radiculopathy  Rec MRI brain acute infarcts Rt frontal and  Rt Internal capsule  CT /MRI Ls spine. old compression Fx/ rib FX multiple.  Pain management but avoid oversedation.  Correct underlying Metabolic causes.  Discussed with Pt, her son and  Dr. Morgan .  PT/OT when stable.  ASA/statin .  Rx UTI.  Not a candidate for long term AC due to high risk of falls.  carotid  doppler f/u with results.  Rahab when stable medically.  F/u with Dr. Holder from tomorrow as needed.

## 2018-05-11 NOTE — DISCHARGE NOTE ADULT - SECONDARY DIAGNOSIS.
Urinary tract infection with hematuria, site unspecified Essential hypertension Knee contracture, left

## 2018-05-11 NOTE — DISCHARGE NOTE ADULT - PLAN OF CARE
rehab take aspirin and lipitor  rehab  follow up with neurologist and cardiologist ertapenem three more days blood pressure medications adjusted in hospital.  May need further adjustment after discharge. ertapenem 1 more day MRI of knee to be followed up after discharge

## 2018-05-11 NOTE — DISCHARGE NOTE ADULT - MEDICATION SUMMARY - MEDICATIONS TO CHANGE
I will SWITCH the dose or number of times a day I take the medications listed below when I get home from the hospital:    KCl-20  -- 1 tab(s) by mouth every other day    torsemide 5 mg oral tablet  -- 1 tab(s) by mouth once a day    Cardizem  mg/24 hours oral capsule, extended release  -- 1 cap(s) by mouth once a day

## 2018-05-11 NOTE — DISCHARGE NOTE ADULT - CARE PROVIDER_API CALL
Roxane Lopez), Neurology  General  [address] [place] [address]  Phone: [phone]  Fax: [phone]    Erasmo Connors), Cardiovascular Disease; Internal Medicine  [address] [place] [address]  Phone: [phone]  Fax: [phone]

## 2018-05-11 NOTE — CONSULT NOTE ADULT - SUBJECTIVE AND OBJECTIVE BOX
CHIEF COMPLAINT: Patient is a 89y old  Female who presents with a chief complaint of fall (07 May 2018 20:36)    HPI:  89 y/o woman with a history of diastolic HF (chronic), pericardial effusion, essential hypertension presented to the ED on 5/7 following a fall at her assisted living facility.  She was admitted to the medical service for the management of a UTI and renal insufficiency.  On examination today she was noted to have left-sided weakness and the diagnosis of a subacute stroke is being considered; cardiology consult called to evaluate for potential cardiac etiology and to assist with telemetry monitoring.  Patient described weakness of her left side - says it started yesterday; unable to identify exacerbating / alleviating factors; no associated pain, paresthesias, or difficulty with speech.  She says that she "just want to go home and go to sleep."       PAST MEDICAL & SURGICAL HISTORY:  CHF (congestive heart failure)  Essential hypertension  No significant past surgical history    SOCIAL HISTORY:   Alcohol: Denied  Smoking: Nonsmoker    FAMILY HISTORY: FAMILY HISTORY:  No pertinent family history in first degree relatives    MEDICATIONS  (STANDING):  aspirin  chewable 81 milliGRAM(s) Oral daily  atorvastatin 20 milliGRAM(s) Oral at bedtime  cefTRIAXone Injectable. 1000 milliGRAM(s) IV Push every 24 hours  diltiazem    milliGRAM(s) Oral daily  docusate sodium 100 milliGRAM(s) Oral daily  heparin  Injectable 5000 Unit(s) SubCutaneous every 8 hours  multivitamin 1 Tablet(s) Oral daily  pantoprazole    Tablet 40 milliGRAM(s) Oral before breakfast  senna 1 Tablet(s) Oral at bedtime    MEDICATIONS  (PRN):  acetaminophen   Tablet. 650 milliGRAM(s) Oral every 6 hours PRN Mild Pain (1 - 3)  traMADol 25 milliGRAM(s) Oral every 8 hours PRN Severe Pain (7 - 10)    Allergies:  ibuprofen (Unknown)  Originally Entered as [Unknown] reaction to [sulfur] (Unknown)  sulfa drugs (Unknown)    REVIEW OF SYSTEMS:  CONSTITUTIONAL: No fevers or chills  Eyes: No visual changes  NECK: No pain or stiffness  RESPIRATORY: No cough, wheezing, hemoptysis; No shortness of breath  CARDIOVASCULAR: No chest pain or palpitations  GASTROINTESTINAL: No abdominal pain. No nausea, vomiting, or hematemesis; No diarrhea or constipation. No melena or hematochezia.  GENITOURINARY: No dysuria, frequency or hematuria  NEUROLOGICAL: As per HPI  SKIN: No itching or rash  All other review of systems is negative unless indicated above    VITAL SIGNS:   Vital Signs Last 24 Hrs  T(C): 36.4 (09 May 2018 11:06), Max: 36.9 (09 May 2018 05:16)  T(F): 97.6 (09 May 2018 11:06), Max: 98.5 (09 May 2018 05:16)  HR: 75 (09 May 2018 11:06) (60 - 78)  BP: 138/84 (09 May 2018 11:06) (135/50 - 158/65)  RR: 17 (09 May 2018 11:06) (16 - 17)  SpO2: 97% (09 May 2018 11:06) (97% - 98%)      PHYSICAL EXAM:  Constitutional: Appears stated age, no distress, awake and alert  HEENT:  Pupils round, No oral cyanosis.  Mouth asymetry  Pulmonary: Non-labored, breath sounds are clear bilaterally, No wheezing, rales or rhonchi  Cardiovascular: S1 and S2, regular rate and rhythm  Gastrointestinal: Bowel Sounds present, soft, nontender.   Lymph: No pedal edema. No cervical lymphadenopathy.  Neurological: Alert, left arm weakness, facial droop  Skin: No rashes.  Psych:  Mood & affect appropriate    LABS:                     12.3   10.32 )-----------( 271      ( 09 May 2018 06:51 )             38.5              143    |  111    |  42     ----------------------------<  105    4.0     |  20     |  1.38     12 Lead ECG (05.07.18 @ 17:27):  Normal sinus rhythm.  Prolonged QT.    Transthoracic Echocardiogram Follow Up (03.24.17 @ 15:20):  Only limited echocardiography views were obtained.  Left ventricle systolic function is preserved.  Estimated left ventricular ejection fraction is 65-70%.  A moderate circumferential pericardial effusion is present (appears similar to prior study dated 01/06/2017).    Xray Chest 1 View AP/PA. (05.07.18 @ 18:09):  Chest: No acute disease.
Patient is a 89y old  Female who presents with a chief complaint of fall (11 May 2018 12:23)    HPI:  Pt is a  89 y/o woman with a PMHx of Diastolic CHF, unspecified type, HTN, urinary incontinence admitted on  for evaluation of fall; found to have new CVA; urine culture from admission growing ESBL E coli; history per medical record as patient unable to provide history.            PMH: as above  PSH: as above  Meds: per reconciliation sheet, noted below  MEDICATIONS  (STANDING):  aspirin  chewable 81 milliGRAM(s) Oral daily  atorvastatin 20 milliGRAM(s) Oral at bedtime  diltiazem    Tablet 60 milliGRAM(s) Oral every 8 hours  docusate sodium 100 milliGRAM(s) Oral daily  heparin  Injectable 5000 Unit(s) SubCutaneous every 8 hours  meropenem  IVPB 1000 milliGRAM(s) IV Intermittent every 12 hours  multivitamin 1 Tablet(s) Oral daily  pantoprazole    Tablet 40 milliGRAM(s) Oral before breakfast  senna 1 Tablet(s) Oral at bedtime    MEDICATIONS  (PRN):  acetaminophen   Tablet. 650 milliGRAM(s) Oral every 6 hours PRN Mild Pain (1 - 3)  traMADol 25 milliGRAM(s) Oral every 8 hours PRN Severe Pain (7 - 10)    Allergies    ibuprofen (Unknown)  Originally Entered as [Unknown] reaction to [sulfur] (Unknown)  sulfa drugs (Unknown)    Intolerances      Social: no smoking, no alcohol, no illegal drugs; no recent travel, no exposure to TB  FAMILY HISTORY:  No pertinent family history in first degree relatives     no history of premature cardivascular disease in first degree relatives  ROS: unable to obtain secondary to patient medical condition     Vital Signs Last 24 Hrs  T(C): 36.8 (11 May 2018 13:55), Max: 36.9 (10 May 2018 22:30)  T(F): 98.2 (11 May 2018 13:55), Max: 98.4 (10 May 2018 22:30)  HR: 76 (11 May 2018 13:55) (63 - 84)  BP: 158/62 (11 May 2018 13:55) (126/53 - 193/64)  BP(mean): --  RR: 18 (11 May 2018 13:55) (16 - 18)  SpO2: 88% (11 May 2018 13:55) (88% - 98%)  Daily     Daily Weight in k.5 (11 May 2018 12:23)    PE:    Constitutional: frail looking  HEENT: NC/AT  Neck: supple; thyroid not palpable  Respiratory: respiratory effort normal; clear to auscultation  Cardiovascular: S1S2 regular, no murmurs  Abdomen: soft, not tender, not distended, positive BS; no liver or spleen organomegaly  Genitourinary: no suprapubic tenderness  Musculoskeletal: no muscle tenderness, no joint swelling or tenderness  Neurological/ Psychiatric: lethargic but arousable  Skin: no rashes; no palpable lesions    Labs: all available labs reviewed                         )-----------( 271      ( 10 May 2018 06:35 )             37.4     05-10    144  |  113<H>  |  39<H>  ----------------------------<  102<H>  4.6   |  22  |  1.58<H>    Ca    8.9      10 May 2018 06:35       Culture - Urine (18 @ 18:08)    -  Amikacin: S <=8    -  Amoxicillin/Clavulanic Acid: S <=8/4    -  Ampicillin: R >16 These ampicillin results predict results for amoxicillin    -  Ampicillin/Sulbactam: R 16/8    -  Aztreonam: R >16    -  Cefazolin: R >16 This predicts results for oral agents cefaclor, cefdinir, cefpodoxime, cefprozil, cefuroxime axetil, cephalexin and locarbef for uncomplicated UTI. Note that some isolates may be susceptible to these agents while testing resistant to cefazolin.    -  Cefepime: R >16    -  Cefoxitin: I 16    -  Ceftriaxone: R >32 Enterobacter, Citrobacter, and Serratia may develop resistance during prolonged therapy    -  Ciprofloxacin: R >2    -  Ertapenem: S <=0.5    -  Gentamicin: S 2    -  Imipenem: S <=1    -  Levofloxacin: R >4    -  Meropenem: S <=1    -  Nitrofurantoin: S <=32 Should not be used to treat pyelonephritis    -  Piperacillin/Tazobactam: R <=8    -  Tigecycline: S <=1    -  Tobramycin: S <=2    -  Trimethoprim/Sulfamethoxazole: R >2/38    Specimen Source: .Urine Clean Catch (Midstream)    Culture Results:   >100,000 CFU/ml Escherichia coli ESBL    Organism Identification: Escherichia coli ESBL    Organism: Escherichia coli ESBL    Method Type: EFRAIN            Radiology: all available radiological tests reviewed    Advanced directives addressed: full resuscitation
Asked to evaluate 89F for left knee pain x 1 day. pt resides at [facility] and had a fall on 5/7/18 then came to [place] ED. Family concerned that patient has been holding her left knee in flexion since the fall and worried about contracture. Pt seen at bedside, poor historian, no family present at the time. X rays of left knee no Fx /dislocation, severe tricompartmental DJD. ambulates with rollator at baseline per chart.     PMH: CHF, HTN,   PSHx: left IMN - surgeon unknown     PE Left knee  skin intact   compartments soft non tender  held in position of flexion   able to extend knee   ROM minus 5-120  subpatellar crepitus   no effusion   no warmth   jointline tenderness   FROM ankle and toes  + EHL FHL GS TA  SILT   DP intact
Patient is a 89y old  Female who presents with a chief complaint of fall  and noted to have left side weakness today and neurology consult called.      HPI:  Pt is a  89 y/o woman with a PMHx of Diastolic CHF, unspecified type, HTN presents to the ED from [facility] assisted [facility] s/p mechanical fall on 5/7/18 . Pt states she was in her bathroom having a BM  and slipped off and fell.   She was then unable to get up due to b/l LE weakness, pain. No LOC. Unsure about head trauma. Pt reports left knee pain, left hip pain, weakness after the fall. Pt states she has ecchymosis below the right knee from fall. No fever or any other acute complaints at this time. Pt is ambulatory with a rollator walker at baseline. Pt is on ASA.   She denies fever/chills, cpain /SOB,  abd, UTI or URI symptoms.  She has chronic urinary incontinence. Noted to have left side weakness  today and neurology consult called. while talking to family at bed side Daughter in law who is a physician think might have fallen because of weakness, unclear, but today weakness more noticeable. But her left LE knee bent backwards and c/o pain and stiffness. X rays done in ER of left Knee, pelvis and c spine and cT brain neg. No prior neurological events. Pt in assisted living facility and able to ambulate with rolling walker. C/o pain in lower back and left knee constantly.      PAST MEDICAL & SURGICAL HISTORY:  CHF (congestive heart failure)  Essential hypertension  No significant past surgical history      FAMILY HISTORY:  No pertinent family history in first degree relatives      Social Hx:  Nonsmoker, no drug or alcohol use    MEDICATIONS  (STANDING):  aspirin  chewable 81 milliGRAM(s) Oral daily  atorvastatin 20 milliGRAM(s) Oral at bedtime  cefTRIAXone Injectable. 1000 milliGRAM(s) IV Push every 24 hours  diltiazem    milliGRAM(s) Oral daily  docusate sodium 100 milliGRAM(s) Oral daily  heparin  Injectable 5000 Unit(s) SubCutaneous every 8 hours  multivitamin 1 Tablet(s) Oral daily  pantoprazole    Tablet 40 milliGRAM(s) Oral before breakfast  senna 1 Tablet(s) Oral at bedtime       Allergies    ibuprofen (Unknown)  Originally Entered as [Unknown] reaction to [sulfur] (Unknown)  sulfa drugs (Unknown)      ROS: Pertinent positives in HPI, all other ROS were reviewed and are negative.      Vital Signs Last 24 Hrs  T(C): 36.3 (09 May 2018 12:32), Max: 36.9 (09 May 2018 05:16)  T(F): 97.4 (09 May 2018 12:32), Max: 98.5 (09 May 2018 05:16)  HR: 68 (09 May 2018 12:32) (60 - 78)  BP: 157/52 (09 May 2018 12:32) (135/50 - 158/65)  BP(mean): --  RR: 17 (09 May 2018 11:06) (16 - 17)  SpO2: 95% (09 May 2018 12:32) (95% - 98%)        Constitutional: awake and alert.  HEENT: PERRLA, EOMI .  Neck: Supple.  Respiratory: Breath sounds are clear bilaterally  Cardiovascular: S1 and S2, regular  rhythm  Gastrointestinal: soft, nontender  Extremities:  no edema  Vascular:  Carotid Bruit - no  Musculoskeletal: c/o pain left LE bent at knee backwards and stiff resists movement.  Skin: No rashes    Neurological exam:  HF: A x O x 3. Appropriately interactive, normal affect. Speech fluent, No Aphasia   CN: ARANZA, EOMI, VFF, left facial droop noted, gag intact.   Motor: Left UE weakness 3-4/5 Left LE difficult to asess flexed and bent backwards, and stiff, resists movement, c/o pain.     Sens: Intact to light touch    Reflexes: Symmetric and normal+2 UE +1 BLE , downgoing toes b/l  Coord:  Not tested  Gait/Balance: Cannot test        Labs:   05-09    143  |  111<H>  |  42<H>  ----------------------------<  105<H>  4.0   |  20<L>  |  1.38<H>    Ca    8.9      09 May 2018 06:51    TPro  7.7  /  Alb  3.4  /  TBili  0.5  /  DBili  x   /  AST  30  /  ALT  12  /  AlkPhos  77  05-07                              12.3   10.32 )-----------( 271      ( 09 May 2018 06:51 )             38.5       Radiology:  - CT Head: 5/9/18  < from: CT Head No Cont (05.09.18 @ 12:35) >  IMPRESSION:       No evidence of acute intracranial abnormality.  No evidence of   hemorrhage.      Age-related involutional changes as above.

## 2018-05-12 LAB
ANION GAP SERPL CALC-SCNC: 9 MMOL/L — SIGNIFICANT CHANGE UP (ref 5–17)
BUN SERPL-MCNC: 44 MG/DL — HIGH (ref 7–23)
CALCIUM SERPL-MCNC: 8.7 MG/DL — SIGNIFICANT CHANGE UP (ref 8.5–10.1)
CHLORIDE SERPL-SCNC: 113 MMOL/L — HIGH (ref 96–108)
CO2 SERPL-SCNC: 22 MMOL/L — SIGNIFICANT CHANGE UP (ref 22–31)
CREAT SERPL-MCNC: 1.77 MG/DL — HIGH (ref 0.5–1.3)
GLUCOSE SERPL-MCNC: 95 MG/DL — SIGNIFICANT CHANGE UP (ref 70–99)
HCT VFR BLD CALC: 39.2 % — SIGNIFICANT CHANGE UP (ref 34.5–45)
HGB BLD-MCNC: 12.2 G/DL — SIGNIFICANT CHANGE UP (ref 11.5–15.5)
MCHC RBC-ENTMCNC: 26.1 PG — LOW (ref 27–34)
MCHC RBC-ENTMCNC: 31.1 GM/DL — LOW (ref 32–36)
MCV RBC AUTO: 83.8 FL — SIGNIFICANT CHANGE UP (ref 80–100)
NRBC # BLD: 0 /100 WBCS — SIGNIFICANT CHANGE UP (ref 0–0)
PLATELET # BLD AUTO: 311 K/UL — SIGNIFICANT CHANGE UP (ref 150–400)
POTASSIUM SERPL-MCNC: 4.3 MMOL/L — SIGNIFICANT CHANGE UP (ref 3.5–5.3)
POTASSIUM SERPL-SCNC: 4.3 MMOL/L — SIGNIFICANT CHANGE UP (ref 3.5–5.3)
RBC # BLD: 4.68 M/UL — SIGNIFICANT CHANGE UP (ref 3.8–5.2)
RBC # FLD: 17.7 % — HIGH (ref 10.3–14.5)
SODIUM SERPL-SCNC: 144 MMOL/L — SIGNIFICANT CHANGE UP (ref 135–145)
WBC # BLD: 8.5 K/UL — SIGNIFICANT CHANGE UP (ref 3.8–10.5)
WBC # FLD AUTO: 8.5 K/UL — SIGNIFICANT CHANGE UP (ref 3.8–10.5)

## 2018-05-12 RX ORDER — TRAMADOL HYDROCHLORIDE 50 MG/1
25 TABLET ORAL EVERY 6 HOURS
Qty: 0 | Refills: 0 | Status: DISCONTINUED | OUTPATIENT
Start: 2018-05-12 | End: 2018-05-12

## 2018-05-12 RX ORDER — TRAMADOL HYDROCHLORIDE 50 MG/1
12.5 TABLET ORAL EVERY 8 HOURS
Qty: 0 | Refills: 0 | Status: DISCONTINUED | OUTPATIENT
Start: 2018-05-12 | End: 2018-05-13

## 2018-05-12 RX ORDER — ERTAPENEM SODIUM 1 G/1
500 INJECTION, POWDER, LYOPHILIZED, FOR SOLUTION INTRAMUSCULAR; INTRAVENOUS
Qty: 0 | Refills: 0 | COMMUNITY
Start: 2018-05-12 | End: 2018-05-14

## 2018-05-12 RX ORDER — DIPHENHYDRAMINE HCL 50 MG
50 CAPSULE ORAL ONCE
Qty: 0 | Refills: 0 | Status: COMPLETED | OUTPATIENT
Start: 2018-05-12 | End: 2018-05-12

## 2018-05-12 RX ADMIN — Medication 50 MILLIGRAM(S): at 08:18

## 2018-05-12 RX ADMIN — HEPARIN SODIUM 5000 UNIT(S): 5000 INJECTION INTRAVENOUS; SUBCUTANEOUS at 21:29

## 2018-05-12 RX ADMIN — Medication 81 MILLIGRAM(S): at 12:58

## 2018-05-12 RX ADMIN — MEROPENEM 100 MILLIGRAM(S): 1 INJECTION INTRAVENOUS at 05:21

## 2018-05-12 RX ADMIN — HEPARIN SODIUM 5000 UNIT(S): 5000 INJECTION INTRAVENOUS; SUBCUTANEOUS at 13:34

## 2018-05-12 RX ADMIN — MEROPENEM 100 MILLIGRAM(S): 1 INJECTION INTRAVENOUS at 17:12

## 2018-05-12 RX ADMIN — Medication 650 MILLIGRAM(S): at 09:24

## 2018-05-12 RX ADMIN — Medication 650 MILLIGRAM(S): at 22:20

## 2018-05-12 RX ADMIN — TRAMADOL HYDROCHLORIDE 25 MILLIGRAM(S): 50 TABLET ORAL at 03:20

## 2018-05-12 RX ADMIN — Medication 650 MILLIGRAM(S): at 21:29

## 2018-05-12 RX ADMIN — Medication 1 TABLET(S): at 12:58

## 2018-05-12 RX ADMIN — ATORVASTATIN CALCIUM 20 MILLIGRAM(S): 80 TABLET, FILM COATED ORAL at 21:28

## 2018-05-12 RX ADMIN — Medication 20 MILLIGRAM(S): at 05:25

## 2018-05-12 RX ADMIN — Medication 100 MILLIGRAM(S): at 12:58

## 2018-05-12 RX ADMIN — HEPARIN SODIUM 5000 UNIT(S): 5000 INJECTION INTRAVENOUS; SUBCUTANEOUS at 05:22

## 2018-05-12 RX ADMIN — Medication 650 MILLIGRAM(S): at 09:30

## 2018-05-12 RX ADMIN — PANTOPRAZOLE SODIUM 40 MILLIGRAM(S): 20 TABLET, DELAYED RELEASE ORAL at 05:31

## 2018-05-12 NOTE — CHART NOTE - NSCHARTNOTEFT_GEN_A_CORE
Called by RN for extensive trunk rash presumably from meropenem dose increase. Benadryl 50 mg IV push administered.

## 2018-05-12 NOTE — PROGRESS NOTE ADULT - ASSESSMENT
Pt is a  89 y/o woman with a PMHx of Diastolic CHF, unspecified type, HTN presents to the ED from [facility] assisted [facility] s/p mechanical fall. Pt states she was in her bathroom having a BM  and slipped off and fell.   She was then unable to get up due to b/l LE weakness, pain. No LOC.  Pt reported left knee pain, right hip pain, weakness after the fall. Pt states she has ecchymosis below the right knee from fall.  Pt is ambulatory with a rollator walker at baseline. Pt is on ASA.      Admitted with mechanical fall and generalized weakness and found to have ESBL UTI and Acute CVA.    # UTI with MDRO/ESBL UTI  - Initially got 3 days rocephin, now switched to Meropenem # 3/5.  Will plan for Ertapenem 500mg once daily to complete course via PIV upon discharge to [facility].  - blood cx negx2  - blood cx were drawn after antibiotics in ER  - ID consult appreciated    # Acute hypoxic resp failure- likely 2/2 pulmonary vascular congestion.  Less likly aspiration pneumonia.- Improved.  - diuresis. restarting daily torsemide.  - aspiration precautions  - continue meropenem until discharge    # L Knee contraction- since admission. Per family did not have prior to fall.   - Ortho asked to re-evaluate  - Splint?  - At risk for developing joint contracture    # YUNIOR on CKD stage 3-4,(baseline cr 1.6-1.7) - pt near BL  - restart ramipril and torsemide    # left sided weakness time of onset unknown 2/2 acute ischemic stroke  - head Ct at time of admission neg  - MRI/A c/w acute cva likely 2/2 either embolic etio or cerebrovascular atherosclerotic disease  - Pt poor candidate for AC d/t high risk of falls/bleeding  - continue aspirin and statin  - cw tele    # CHF,diastolic chronic  - stable. cw ACEI and diuretic.    # Htn  - dilt, altase  - avoid hypotension    Discussed with Dr. Lopez , Dr. Ji

## 2018-05-12 NOTE — CHART NOTE - NSCHARTNOTEFT_GEN_A_CORE
Called to re-evaluate Pt's left knee for flexion contracture. Pt L knee flexed in chair, unchanged from prior exam on initial consultation. No new trauma while in hospital. Able to fully extend L knee passively. No indication for splinting/immobilization of knee if no fracture/injury present.

## 2018-05-13 VITALS
OXYGEN SATURATION: 97 % | SYSTOLIC BLOOD PRESSURE: 151 MMHG | HEART RATE: 73 BPM | DIASTOLIC BLOOD PRESSURE: 56 MMHG | TEMPERATURE: 97 F

## 2018-05-13 LAB
ANION GAP SERPL CALC-SCNC: 9 MMOL/L — SIGNIFICANT CHANGE UP (ref 5–17)
BUN SERPL-MCNC: 50 MG/DL — HIGH (ref 7–23)
CALCIUM SERPL-MCNC: 8.2 MG/DL — LOW (ref 8.5–10.1)
CHLORIDE SERPL-SCNC: 112 MMOL/L — HIGH (ref 96–108)
CO2 SERPL-SCNC: 23 MMOL/L — SIGNIFICANT CHANGE UP (ref 22–31)
CREAT SERPL-MCNC: 1.91 MG/DL — HIGH (ref 0.5–1.3)
CULTURE RESULTS: SIGNIFICANT CHANGE UP
CULTURE RESULTS: SIGNIFICANT CHANGE UP
GLUCOSE SERPL-MCNC: 93 MG/DL — SIGNIFICANT CHANGE UP (ref 70–99)
POTASSIUM SERPL-MCNC: 4.4 MMOL/L — SIGNIFICANT CHANGE UP (ref 3.5–5.3)
POTASSIUM SERPL-SCNC: 4.4 MMOL/L — SIGNIFICANT CHANGE UP (ref 3.5–5.3)
SODIUM SERPL-SCNC: 144 MMOL/L — SIGNIFICANT CHANGE UP (ref 135–145)
SPECIMEN SOURCE: SIGNIFICANT CHANGE UP
SPECIMEN SOURCE: SIGNIFICANT CHANGE UP

## 2018-05-13 RX ORDER — CYCLOBENZAPRINE HYDROCHLORIDE 10 MG/1
5 TABLET, FILM COATED ORAL ONCE
Qty: 0 | Refills: 0 | Status: COMPLETED | OUTPATIENT
Start: 2018-05-13 | End: 2018-05-13

## 2018-05-13 RX ORDER — RAMIPRIL 5 MG
1 CAPSULE ORAL
Qty: 0 | Refills: 0 | COMMUNITY

## 2018-05-13 RX ORDER — DILTIAZEM HCL 120 MG
1 CAPSULE, EXT RELEASE 24 HR ORAL
Qty: 0 | Refills: 0 | COMMUNITY
Start: 2018-05-13

## 2018-05-13 RX ORDER — ACETAMINOPHEN 500 MG
2 TABLET ORAL
Qty: 0 | Refills: 0 | COMMUNITY
Start: 2018-05-13

## 2018-05-13 RX ADMIN — MEROPENEM 100 MILLIGRAM(S): 1 INJECTION INTRAVENOUS at 05:31

## 2018-05-13 RX ADMIN — HEPARIN SODIUM 5000 UNIT(S): 5000 INJECTION INTRAVENOUS; SUBCUTANEOUS at 16:31

## 2018-05-13 RX ADMIN — HEPARIN SODIUM 5000 UNIT(S): 5000 INJECTION INTRAVENOUS; SUBCUTANEOUS at 05:31

## 2018-05-13 RX ADMIN — SENNA PLUS 1 TABLET(S): 8.6 TABLET ORAL at 00:56

## 2018-05-13 RX ADMIN — CYCLOBENZAPRINE HYDROCHLORIDE 5 MILLIGRAM(S): 10 TABLET, FILM COATED ORAL at 11:01

## 2018-05-13 RX ADMIN — Medication 81 MILLIGRAM(S): at 11:19

## 2018-05-13 RX ADMIN — PANTOPRAZOLE SODIUM 40 MILLIGRAM(S): 20 TABLET, DELAYED RELEASE ORAL at 05:32

## 2018-05-13 RX ADMIN — TRAMADOL HYDROCHLORIDE 12.5 MILLIGRAM(S): 50 TABLET ORAL at 09:17

## 2018-05-13 RX ADMIN — Medication 100 MILLIGRAM(S): at 11:19

## 2018-05-13 RX ADMIN — Medication 1 TABLET(S): at 11:19

## 2018-05-13 RX ADMIN — Medication 5 MILLIGRAM(S): at 05:32

## 2018-05-13 NOTE — PROGRESS NOTE ADULT - SUBJECTIVE AND OBJECTIVE BOX
CHIEF COMPLAINT: fall    SUBJECTIVE: Resp better. Was awake this AM, c/o severe back pain, got 25mg tramadol and became very drowsy after.  Pt keeping L knee in tight flexion, very resistant with attempt to extend the knee.  Family at bedside concerned that pt will develop a contracture at the joint and be unable to participate in rehab.  Requesting neuro and ortho re-eval.    REVIEW OF SYSTEMS: uto d/t mental status    Vital Signs Last 24 Hrs  T(C): 36.4 (13 May 2018 11:52), Max: 36.5 (13 May 2018 04:58)  T(F): 97.6 (13 May 2018 11:52), Max: 97.7 (13 May 2018 04:58)  HR: 89 (13 May 2018 11:52) (52 - 89)  BP: 147/51 (13 May 2018 11:52) (117/46 - 169/56)  BP(mean): 85 (13 May 2018 04:58) (85 - 85)  RR: 20 (13 May 2018 11:52) (20 - 52)  SpO2: 100% (13 May 2018 11:52) (96% - 100%)    PHYSICAL EXAM:  Constitutional: NAD  HEENT: Atraumatic, SHIRAZ, Normal, No congestion  Respiratory: Breath Sounds normal, no rhonchi/wheeze  Cardiovascular: N S1S2;   Gastrointestinal: Abdomen soft, non tender, Bowel sounds present  Extremities: No edema, peripheral pulses present  Neurological: awake ,alert, left facial droop, left arm weakness and left leg flexion   Back: No CVA tenderness   Musculoskeletal: non tender    MEDICATIONS:  MEDICATIONS  (STANDING):  aspirin  chewable 81 milliGRAM(s) Oral daily  atorvastatin 20 milliGRAM(s) Oral at bedtime  diltiazem    Tablet 60 milliGRAM(s) Oral every 8 hours  docusate sodium 100 milliGRAM(s) Oral daily  heparin  Injectable 5000 Unit(s) SubCutaneous every 8 hours  meropenem  IVPB 1000 milliGRAM(s) IV Intermittent every 12 hours  multivitamin 1 Tablet(s) Oral daily  pantoprazole    Tablet 40 milliGRAM(s) Oral before breakfast  senna 1 Tablet(s) Oral at bedtime  torsemide 5 milliGRAM(s) Oral daily    LABS: All Labs Reviewed:                        12.2   8.50  )-----------( 311      ( 12 May 2018 05:44 )             39.2     144  |  112<H>  |  50<H>  ----------------------------<  93  4.4   |  23  |  1.91<H>    Ca    8.2<L>      13 May 2018 06:17
HPI:  Pt is a  89 y/o woman with a PMHx of Diastolic CHF, unspecified type, HTN presents to the ED from [facility] s/p mechanical fall on 5/7/18 . Pt states she was in her bathroom having a BM  and slipped off and fell.   She was then unable to get up due to b/l LE weakness, pain. No LOC. Unsure about head trauma. Pt reports left knee pain, left hip pain, weakness after the fall. Pt states she has ecchymosis below the right knee from fall. No fever or any other acute complaints at this time. Pt is ambulatory with a rollator walker at baseline. Pt is on ASA.   She denies fever/chills, cpain /SOB,  abd, UTI or URI symptoms.  She has chronic urinary incontinence. Noted to have left side weakness  today and neurology consult called. while talking to family at bed side Daughter in law who is a physician think might have fallen because of weakness, unclear, but today weakness more noticeable. But her left LE knee bent backwards and c/o pain and stiffness. X rays done in ER of left Knee, pelvis and c spine and cT brain neg. No prior neurological events. Pt in assisted living facility and able to ambulate with rolling walker. C/o pain in lower back and left knee constantly.    5/10/18 : Pt sleeping, easily arousable, in Isolation for ESBL UTI,  , Left side weakness persisting and lethargic from pain medication Tramadol. Pt states pain is not as bad like yesterday. wants to be left alone and wants to go home. Seen by Cardiology and pT. MRI brain and cT LS  spine were done.     MEDICATIONS  (STANDING):  aspirin  chewable 81 milliGRAM(s) Oral daily  atorvastatin 20 milliGRAM(s) Oral at bedtime  cefTRIAXone Injectable. 1000 milliGRAM(s) IV Push every 24 hours  diltiazem    milliGRAM(s) Oral daily  docusate sodium 100 milliGRAM(s) Oral daily  heparin  Injectable 5000 Unit(s) SubCutaneous every 8 hours  multivitamin 1 Tablet(s) Oral daily  pantoprazole    Tablet 40 milliGRAM(s) Oral before breakfast  senna 1 Tablet(s) Oral at bedtime      Vital Signs Last 24 Hrs  T(C): 36.6 (10 May 2018 04:40), Max: 36.6 (09 May 2018 17:04)  T(F): 97.9 (10 May 2018 04:40), Max: 97.9 (10 May 2018 04:40)  HR: 83 (10 May 2018 04:40) (68 - 83)  BP: 146/53 (10 May 2018 04:40) (138/84 - 157/52)  BP(mean): 78 (10 May 2018 04:40) (78 - 78)  RR: 18 (09 May 2018 17:04) (17 - 18)  SpO2: 97% (10 May 2018 04:40) (95% - 97%)      Neurological exam:  HF: A x O x  2, lethargic, but easily arousable,  No Aphasia   CN: ARANZA, EOMI, VFF, left facial droop noted, gag intact.   Motor: Left UE weakness 3-4/5 Left LE weak but still c/o mild pain able to examine not as stiff but still mildly flexed position.      Sens: Intact to light touch    Reflexes: Symmetric and normal+2 UE +1 BLE ,  withdrawing bilaterally.  Coord:  Not tested  Gait/Balance: Cannot test                    11.7   12.12 )-----------( 271      ( 10 May 2018 06:35 )             37.4     05-10    144  |  113<H>  |  39<H>  ----------------------------<  102<H>  4.6   |  22  |  1.58<H>    Ca    8.9      10 May 2018 06:35    Radiology report:  - CT Head 5/9/18  < from: CT Head No Cont (05.09.18 @ 12:35) >  IMPRESSION:       No evidence of acute intracranial abnormality.  No evidence of   hemorrhage.      Age-related involutional changes as above.  - MRI brain 5/9/18  < from: MR Head No Cont (05.09.18 @ 21:23) >  IMPRESSION:   Images degraded by patient motion.     Chronic microvascular disease. 2 small acute right frontal cortical   infarcts. Acute lacunar infarction in the posterior limb of the RIGHT   internal capsule.    - MRA brain  < from: MR Angio Head No Cont (05.09.18 @ 21:23) >  IMPRESSION:   Examination markedly limited by patient motion.     1. 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    2. There is collateral flow to the right MCA which is decreased in   caliber.   There is decreased visualization of right MCA branches compared to the   contralateral vessel.    3. Hypoplastic right vertebral artery. The dominant left vertebral   artery   primarily supplies the basilar artery.    4. Left posterior cerebral artery is not visualized. It is occluded or   severely stenotic.  CT LS Spine   < from: CT Lumbar Spine No Cont (05.09.18 @ 14:51) >  Fractures of the right lateral fourth and fifth ribs appear subacute to   chronic in nature, and clinical correlation withtrauma and point   tenderness is recommended. Chronic appearing fractures of the left sixth   seventh eighth ribs are present. Evaluation is mildly limited by motion   artifact.    Mild chronic appearing compression fracture along the superior endplate   of L2. No definite acute fractures within the thoracic or lumbar spine.     Small right pleural effusion with heterogeneous hyperdense material   within the right pleural effusion, suggestive of prior pleurodesis. There   is associated atelectasis suggested. Partial visualization of a moderate   left pleural effusion with underlying compressive atelectasis.    Small pericardial effusion. Cardiomegaly.
HPI:  Pt is a  89 y/o woman with a PMHx of Diastolic CHF, unspecified type, HTN presents to the ED from [facility] s/p mechanical fall on 5/7/18 . Pt states she was in her bathroom having a BM  and slipped off and fell.   She was then unable to get up due to b/l LE weakness, pain. No LOC. Unsure about head trauma. Pt reports left knee pain, left hip pain, weakness after the fall. Pt states she has ecchymosis below the right knee from fall. No fever or any other acute complaints at this time. Pt is ambulatory with a rollator walker at baseline. Pt is on ASA.   She denies fever/chills, cpain /SOB,  abd, UTI or URI symptoms.  She has chronic urinary incontinence. Noted to have left side weakness  today and neurology consult called. while talking to family at bed side Daughter in law who is a physician think might have fallen because of weakness, unclear, but today weakness more noticeable. But her left LE knee bent backwards and c/o pain and stiffness. X rays done in ER of left Knee, pelvis and c spine and cT brain neg. No prior neurological events. Pt in assisted living facility and able to ambulate with rolling walker. C/o pain in lower back and left knee constantly.    5/10/18 : Pt sleeping, easily arousable, in Isolation for ESBL UTI,  , Left side weakness persisting and lethargic from pain medication Tramadol. Pt states pain is not as bad like yesterday. wants to be left alone and wants to go home. Seen by Cardiology and pT. MRI brain and cT LS  spine were done. acute CVA reported.    5/11/18 : Pt feeling same, pain improving, no new c/o. Left side weakness persisting . UTI being Rx with antibiotics.     MEDICATIONS  (STANDING):  aspirin  chewable 81 milliGRAM(s) Oral daily  atorvastatin 20 milliGRAM(s) Oral at bedtime  diltiazem    Tablet 60 milliGRAM(s) Oral every 8 hours  docusate sodium 100 milliGRAM(s) Oral daily  heparin  Injectable 5000 Unit(s) SubCutaneous every 8 hours  meropenem  IVPB 1000 milliGRAM(s) IV Intermittent every 12 hours  multivitamin 1 Tablet(s) Oral daily  pantoprazole    Tablet 40 milliGRAM(s) Oral before breakfast  senna 1 Tablet(s) Oral at bedtime      Vital Signs Last 24 Hrs  T(C): 36.4 (11 May 2018 04:15), Max: 37.1 (10 May 2018 10:41)  T(F): 97.6 (11 May 2018 04:15), Max: 98.8 (10 May 2018 10:41)  HR: 63 (11 May 2018 06:49) (63 - 87)  BP: 163/56 (11 May 2018 06:49) (126/53 - 193/64)  BP(mean): --  RR: 17 (11 May 2018 04:15) (16 - 18)  SpO2: 97% (11 May 2018 04:15) (95% - 98%)      Neurological exam:  HF: A x O x  2, lethargic, but easily arousable,  No Aphasia   CN: ARANZA, EOMI, VFF, left facial droop noted, gag intact.   Motor: Left UE weakness 3-4/5 Left LE weak but still c/o mild pain able to examine not as stiff but still mildly flexed position.      Sens: Intact to light touch    Reflexes: Symmetric and normal+2 UE +1 BLE ,  withdrawing bilaterally.  Coord:  Not tested  Gait/Balance: Cannot test                              11.7   12.12 )-----------( 271      ( 10 May 2018 06:35 )             37.4     05-10    144  |  113<H>  |  39<H>  ----------------------------<  102<H>  4.6   |  22  |  1.58<H>    Ca    8.9      10 May 2018 06:35        05-10 Chol 152 LDL 75 HDL 59 Trig 90    Radiology report:  - MRI brain 5/9/18  < from: MR Head No Cont (05.09.18 @ 21:23) >  IMPRESSION:   Images degraded by patient motion.     Chronic microvascular disease. 2 small acute right frontal cortical   infarcts. Acute lacunar infarction in the posterior limb of the RIGHT   internal capsule.    - MRA brain  < from: MR Angio Head No Cont (05.09.18 @ 21:23) >  IMPRESSION:   Examination markedly limited by patient motion.     1. 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    2. There is collateral flow to the right MCA which is decreased in   caliber.   There is decreased visualization of right MCA branches compared to the   contralateral vessel.    3. Hypoplastic right vertebral artery. The dominant left vertebral   artery   primarily supplies the basilar artery.    4. Left posterior cerebral artery is not visualized. It is occluded or   severely stenotic.  CT LS Spine   < from: CT Lumbar Spine No Cont (05.09.18 @ 14:51) >  Fractures of the right lateral fourth and fifth ribs appear subacute to   chronic in nature, and clinical correlation withtrauma and point   tenderness is recommended. Chronic appearing fractures of the left sixth   seventh eighth ribs are present. Evaluation is mildly limited by motion     Mild chronic appearing compression fracture along the superior endplate   of L2. No definite acute fractures within the thoracic or lumbar spine.     Small right pleural effusion with heterogeneous hyperdense material   within the right pleural effusion, suggestive of prior pleurodesis. There   is associated atelectasis suggested. Partial visualization of a moderate   left pleural effusion with underlying compressive atelectasis.    Small pericardial effusion. Cardiomegaly.
CHIEF COMPLAINT:   Patient is a 89y old  Female who presents with a chief complaint of fall.    HPI:  89 y/o woman with a history of diastolic HF (chronic), pericardial effusion, essential hypertension presented to the ED on 5/7 following a fall at her assisted living facility.  She was admitted to the medical service for the management of a UTI and renal insufficiency.  On examination today she was noted to have left-sided weakness and the diagnosis of a subacute stroke is being considered; cardiology consult called to evaluate for potential cardiac etiology and to assist with telemetry monitoring.  Patient described weakness of her left side - says it started yesterday; unable to identify exacerbating / alleviating factors; no associated pain, paresthesias, or difficulty with speech.  She says that she "just want to go home and go to sleep."       05/10/18 - lethargic but easily arousable ( received Ultram last night). Denies chest pain or SOB.   5/11/18 - Able to answer question and no new complaints.     PAST MEDICAL & SURGICAL HISTORY:  CHF (congestive heart failure)  Essential hypertension  No significant past surgical history    Allergies  ibuprofen (Unknown)  Originally Entered as [Unknown] reaction to [sulfur] (Unknown)  sulfa drugs (Unknown)    MEDICATIONS  (STANDING):  aspirin  chewable 81 milliGRAM(s) Oral daily  atorvastatin 20 milliGRAM(s) Oral at bedtime  diltiazem    Tablet 60 milliGRAM(s) Oral every 8 hours  docusate sodium 100 milliGRAM(s) Oral daily  heparin  Injectable 5000 Unit(s) SubCutaneous every 8 hours  meropenem  IVPB 1000 milliGRAM(s) IV Intermittent every 12 hours  multivitamin 1 Tablet(s) Oral daily  pantoprazole    Tablet 40 milliGRAM(s) Oral before breakfast  senna 1 Tablet(s) Oral at bedtime    MEDICATIONS  (PRN):  acetaminophen   Tablet. 650 milliGRAM(s) Oral every 6 hours PRN Mild Pain (1 - 3)  traMADol 25 milliGRAM(s) Oral every 8 hours PRN Severe Pain (7 - 10)      Vital Signs Last 24 Hrs  T(C): 36.9 (11 May 2018 10:59), Max: 36.9 (10 May 2018 22:30)  T(F): 98.4 (11 May 2018 10:59), Max: 98.4 (10 May 2018 22:30)  HR: 75 (11 May 2018 10:59) (63 - 84)  BP: 136/60 (11 May 2018 10:59) (126/53 - 173/64)  BP(mean): --  RR: 16 (11 May 2018 10:59) (16 - 17)  SpO2: 94% (11 May 2018 10:59) (94% - 98%)      PHYSICAL EXAM:    Constitutional: NAD, lethargic but easily arousable.   HEENT: NC,. No oral cyanosis. .  Respiratory: Breath sounds are clear bilaterally, No wheezing, rales or rhonchi  Cardiovascular: S1 and S2, regular  rhythm.  Gastrointestinal: Bowel Sounds present, soft, nontender.   Extremities: No peripheral edema. No clubbing or cyanosis.  Neurological: lethargic but easily arousable.   Musculoskeletal: no calf tenderness.  Skin: No rashes.      LABS: All Labs Reviewed:                                   11.7 12.12 )-----------( 271      ( 10 May 2018 06:35 )             37.4     05-10    144  |  113<H>  |  39<H>  ----------------------------<  102<H>  4.6   |  22  |  1.58<H>    Ca    8.9      10 May 2018 06:35      RADIOLOGY:    IMPRESSION: Chest: No acute disease.  Pelvis/right hip: No fracture. Status post right hip and femur open   reduction/internal fixation  Bilateral knees: No fracture. Moderate right and severe left knee   degenerative changes.    EKG    Ventricular Rate 81 BPM    Atrial Rate 81 BPM    P-R Interval 174 ms    QRS Duration 72 ms    Q-T Interval 422 ms    QTC Calculation(Bezet) 490 ms    P Axis 65 degrees    R Axis 20 degrees    T Axis 70 degrees    Diagnosis Line Normal sinus rhythm  Prolonged QT  Abnormal ECG    Confirmed by KEITH WASHINGTON MD ([id number]) on 5/8/2018 8:40:10 AM       Summary - echocardiogram -      Only limited echocardiography views were obtained.   Left ventricle systolic function is preserved.   Estimated left ventricular ejection fraction is 65-70%.   Moderate mitral annular calcification is present.   A moderate circumferential pericardial effusion is present .   Appears similar to prior study dated 01/06/2017   Dr. Santoro aware of study findings.   Pleural effusion - is present..     Signature     ----------------------------------------------------------------   Electronically signed by Norberto Nichols MD(Interpreting   physician) on 03/24/2017 06:17 PM   ----------------------------------------------------------------    COMPARISON: CT head 5/7/2018    FINDINGS:     The ventricles and sulci are prominent, compatible with age-related   generalized cerebral volume loss.   There is no CT evidence for acute   cerebral cortical infarct. There is no evidence of hemorrhage. There is   periventricular and subcortical white matter hypoattenuation,  most   compatible with mild chronic microvascular ischemic changes.   No mass   effect is found in the brain.  There is no midline shift or herniation   pattern.      The visualized portions of the paranasal sinuses and mastoid air cells   are clear.      IMPRESSION:       No evidence of acute intracranial abnormality.  No evidence of   hemorrhage.      Age-related involutional changes as above.      HENRI NUNEZ M.D., ATTENDING RADIOLOGIST  This document has been electronically signed. May  9 2018  1:19PM      telemetry -  sinus with occasional apc. No a. fib.     < from: Transthoracic Echocardiogram (05.10.18 @ 14:13) >  Summary     The left ventricle is normal in size, wall motion and contractility.   Mild concentric left ventricular hypertrophy is present.   Estimated left ventricular ejection fraction is 55-60 %.   The left atrium is mildly dilated.   Normal appearing right atrium.   Normal appearing right ventricle structure and function.   Significant fibrocalcific changes noted to the aortic valve leaflets with   restriction in leaflet excursion. Peak transaortic gradient is 35 mmHg;   this finding is consistent with mild aortic stenosis.   Fibrocalcific changes noted to the mitral valve leaflets with restriction   in mitral leaflet excursion. Mean transmitral gradient is 4 mmHg; this is   finding is consistent with mild mitral stenosis. Mild mitral   regurgitation   noted.   Moderate (2+) tricuspid valve regurgitation is present.   Moderate pulmonary hypertension.   No evidence of pericardial effusion.    < end of copied text >
CHIEF COMPLAINT: fall    SUBJECTIVE: No overnight events.  Plan for dc to [facility] today.    REVIEW OF SYSTEMS: uto d/t mental status    Vital Signs Last 24 Hrs  T(C): 36.9 (11 May 2018 10:59), Max: 36.9 (10 May 2018 22:30)  T(F): 98.4 (11 May 2018 10:59), Max: 98.4 (10 May 2018 22:30)  HR: 75 (11 May 2018 10:59) (63 - 84)  BP: 156/60 (11 May 2018 10:59) (126/53 - 193/64)  RR: 16 (11 May 2018 10:59) (16 - 17)  SpO2: 94% (11 May 2018 10:59) (94% - 98%)    PHYSICAL EXAM:  ConstitutionalNAD  HEENT: Atraumatic, SHIRAZ, Normal, No congestion  Respiratory: Breath Sounds normal, no rhonchi/wheeze  Cardiovascular: N S1S2;   Gastrointestinal: Abdomen soft, non tender, Bowel Ssounds present  Extremities: No edema, peripheral pulses present  Neurological: awake ,alert, left facial droop, left arm weakness and left leg flexion   Back: No CVA tenderness   Musculoskeletal: non tender    MEDICATIONS:  MEDICATIONS  (STANDING):  aspirin  chewable 81 milliGRAM(s) Oral daily  atorvastatin 20 milliGRAM(s) Oral at bedtime  diltiazem    Tablet 60 milliGRAM(s) Oral every 8 hours  docusate sodium 100 milliGRAM(s) Oral daily  heparin  Injectable 5000 Unit(s) SubCutaneous every 8 hours  meropenem  IVPB 1000 milliGRAM(s) IV Intermittent every 12 hours  multivitamin 1 Tablet(s) Oral daily  pantoprazole    Tablet 40 milliGRAM(s) Oral before breakfast  senna 1 Tablet(s) Oral at bedtime    LABS: All Labs Reviewed:                        11.7   12.12 )-----------( 271      ( 10 May 2018 06:35 )             37.4     144  |  113<H>  |  39<H>  ----------------------------<  102<H>  4.6   |  22  |  1.58<H>    Ca    8.9      10 May 2018 06:35    Blood Culture: 05-07 @ 21:41  Organism --  Gram Stain Blood -- Gram Stain --  Specimen Source .Blood None  Culture-Blood --    05-07 @ 21:40  Organism --  Gram Stain Blood -- Gram Stain --  Specimen Source .Blood None  Culture-Blood --    05-07 @ 18:08  Organism Escherichia coli ESBL  Gram Stain Blood -- Gram Stain --  Specimen Source .Urine Clean Catch (Midstream)  Culture-Blood --
CHIEF COMPLAINT: fall    SUBJECTIVE: Pt w acute CVA on MRI and abnormal MRA.  BP permissively high.  uto any information from pt d/t mental status. +ESBL Ecoli.    REVIEW OF SYSTEMS: uto d/t mental status    Vital Signs Last 24 Hrs  T(C): 37.1 (10 May 2018 10:41), Max: 37.1 (10 May 2018 10:41)  T(F): 98.8 (10 May 2018 10:41), Max: 98.8 (10 May 2018 10:41)  HR: 87 (10 May 2018 10:41) (83 - 87)  BP: 172/60 (10 May 2018 18:00) (146/53 - 187/65)  BP(mean): 78 (10 May 2018 04:40) (78 - 78)  RR: 18 (10 May 2018 10:41) (18 - 18)  SpO2: 95% (10 May 2018 10:41) (95% - 97%)    PHYSICAL EXAM:  ConstitutionalNAD  HEENT: Atraumatic, SHIRAZ, Normal, No congestion  Respiratory: Breath Sounds normal, no rhonchi/wheeze  Cardiovascular: N S1S2;   Gastrointestinal: Abdomen soft, non tender, Bowel Ssounds present  Extremities: No edema, peripheral pulses present  Neurological: awake ,alert, left facial droop, left arm weakness and left leg flexion   Back: No CVA tenderness   Musculoskeletal: non tender  Breasts: DeferredGenitourinary: deferred    MEDICATIONS:  MEDICATIONS  (STANDING):  aspirin  chewable 81 milliGRAM(s) Oral daily  atorvastatin 20 milliGRAM(s) Oral at bedtime  diltiazem    Tablet 60 milliGRAM(s) Oral every 8 hours  docusate sodium 100 milliGRAM(s) Oral daily  heparin  Injectable 5000 Unit(s) SubCutaneous every 8 hours  meropenem  IVPB 1000 milliGRAM(s) IV Intermittent every 12 hours  multivitamin 1 Tablet(s) Oral daily  pantoprazole    Tablet 40 milliGRAM(s) Oral before breakfast  senna 1 Tablet(s) Oral at bedtime    LABS: All Labs Reviewed:                        11.7   12.12 )-----------( 271      ( 10 May 2018 06:35 )             37.4     144  |  113<H>  |  39<H>  ----------------------------<  102<H>  4.6   |  22  |  1.58<H>    Ca    8.9      10 May 2018 06:35    Blood Culture: 05-07 @ 21:41  Organism --  Gram Stain Blood -- Gram Stain --  Specimen Source .Blood None  Culture-Blood --    05-07 @ 21:40  Organism --  Gram Stain Blood -- Gram Stain --  Specimen Source .Blood None  Culture-Blood --    05-07 @ 18:08  Organism Escherichia coli ESBL  Gram Stain Blood -- Gram Stain --  Specimen Source .Urine Clean Catch (Midstream)  Culture-Blood --
CHIEF COMPLAINT: fall    SUBJECTIVE: Resp better. Was awake this AM, c/o severe back pain, got 25mg tramadol and became very drowsy after.  Pt keeping L knee in tight flexion, very resistant with attempt to extend the knee.  Family at bedside concerned that pt will develop a contracture at the joint and be unable to participate in rehab.  Requesting neuro and ortho re-eval.    REVIEW OF SYSTEMS: uto d/t mental status    Vital Signs Last 24 Hrs  T(C): 36.7 (12 May 2018 10:44), Max: 36.9 (11 May 2018 17:15)  T(F): 98 (12 May 2018 10:44), Max: 98.5 (11 May 2018 17:15)  HR: 73 (12 May 2018 10:44) (71 - 76)  BP: 175/83 (12 May 2018 10:44) (144/46 - 175/83)  RR: 73 (12 May 2018 10:44) (16 - 73)  SpO2: 98% (12 May 2018 10:44) (88% - 100%)    PHYSICAL EXAM:  Constitutional: NAD  HEENT: Atraumatic, SHIRAZ, Normal, No congestion  Respiratory: Breath Sounds normal, no rhonchi/wheeze  Cardiovascular: N S1S2;   Gastrointestinal: Abdomen soft, non tender, Bowel sounds present  Extremities: No edema, peripheral pulses present  Neurological: awake ,alert, left facial droop, left arm weakness and left leg flexion   Back: No CVA tenderness   Musculoskeletal: non tender    MEDICATIONS:  MEDICATIONS  (STANDING):  aspirin  chewable 81 milliGRAM(s) Oral daily  atorvastatin 20 milliGRAM(s) Oral at bedtime  diltiazem    Tablet 60 milliGRAM(s) Oral every 8 hours  docusate sodium 100 milliGRAM(s) Oral daily  heparin  Injectable 5000 Unit(s) SubCutaneous every 8 hours  meropenem  IVPB 1000 milliGRAM(s) IV Intermittent every 12 hours  multivitamin 1 Tablet(s) Oral daily  pantoprazole    Tablet 40 milliGRAM(s) Oral before breakfast  senna 1 Tablet(s) Oral at bedtime    LABS: All Labs Reviewed:                        12.2   8.50  )-----------( 311      ( 12 May 2018 05:44 )             39.2     144  |  113<H>  |  44<H>  ----------------------------<  95  4.3   |  22  |  1.77<H>    Ca    8.7      12 May 2018 05:44    Blood Culture: 05-07 @ 21:41  Organism --  Gram Stain Blood -- Gram Stain --  Specimen Source .Blood None  Culture-Blood --    05-07 @ 21:40  Organism --  Gram Stain Blood -- Gram Stain --  Specimen Source .Blood None  Culture-Blood --    05-07 @ 18:08  Organism Escherichia coli ESBL  Gram Stain Blood -- Gram Stain --  Specimen Source .Urine Clean Catch (Midstream)  Culture-Blood --
Patient seen and examined. Patient is incoherent, poor historian, poor cooperation with exam. Patient complains of B/L knee pain (L>R) and low back pain. Patient unable to describe events preceding hospital admission. Not able to recall recent trauma but apparently had recent fall at [facility]. Denies numbness/tingling. Denies pain/injury elsewhere.     Vital Signs Last 24 Hrs  T(C): 36.9 (09 May 2018 05:16), Max: 36.9 (09 May 2018 05:16)  T(F): 98.5 (09 May 2018 05:16), Max: 98.5 (09 May 2018 05:16)  HR: 78 (09 May 2018 05:16) (60 - 78)  BP: 158/65 (09 May 2018 05:16) (135/50 - 181/87)  BP(mean): --  RR: 16 (09 May 2018 05:16) (16 - 18)  SpO2: 98% (09 May 2018 05:16) (97% - 98%)                          12.3   10.32 )-----------( 271      ( 09 May 2018 06:51 )             38.5   05-09    143  |  111<H>  |  42<H>  ----------------------------<  105<H>  4.0   |  20<L>  |  1.38<H>    Ca    8.9      09 May 2018 06:51    TPro  7.7  /  Alb  3.4  /  TBili  0.5  /  DBili  x   /  AST  30  /  ALT  12  /  AlkPhos  77  05-07    AP view of the chest  AP view of the pelvis; AP and axial views of the right hip  AP, lateral, oblique views of both knees    COMPARISON: Chest x-ray March 25, 2017    FINDINGS: Chest: Clear lungs. Unremarkable cardiac and mediastinal   contours. Atherosclerotic arterial calcifications. Left axillary surgical   clips. No acute bony abnormality.    Pelvis/right hip: There is no fracture or dislocation. The patient is   status post open reduction/internal fixation of the right femur with a   compression screw and intramedullary stefanie in place.    Bilateral knees: There is diffuse bony demineralization. Intramedullary   stefanie noted in the right femur. No fracture or dislocation is identified.   Moderate right knee degenerative changes with joint space narrowing and   osteophyte formation. Severe left knee degenerative changes with   tricompartmental joint space narrowing.    IMPRESSION: Chest: No acute disease.  Pelvis/right hip: No fracture. Status post right hip and femur open   reduction/internal fixation  Bilateral knees: No fracture. Moderate right and severe left knee   degenerative changes.    PE:   Gen: incoherent rambling  LLE: skin intact, patient maintains knee in flexed position, resists straightening, eventually able to straighten knee and range 0-90, no apparent knee ttp, no ttp elsewhere, no knee swelling/effusion, able to SLR, neg log roll, patient refuses to move foot, L2-S1 SILT, no calf TTP, 2+ DP pulse  RLE: skin intact, no apparent knee ttp, no ttp elsewhere, range 0-90, no apparent knee ttp, no ttp elsewhere, no knee swelling/effusion, able to SLR, neg log roll, patient refuses to move foot, L2-S1 SILT, no calf TTP, 2+ DP pulse  2/2: full painless ROM B/L UE, no bony ttp B/L UE, vague lumbar TTP
Reason for Admission: fall	  History of Present Illness: 	  Pt is a  89 y/o woman with a PMHx of Diastolic CHF, unspecified type, HTN presents to the ED from [facility] assisted living s/p mechanical fall today. Pt states she was in her bathroom having a BM  and slipped off and fell.   She was then unable to get up due to b/l LE weakness, pain. No LOC. Unsure about head trauma. Pt reports left knee pain, right hip pain, weakness after the fall. Pt states she has ecchymosis below the right knee from fall. No fever or any other acute complaints at this time. Pt is ambulatory with a rollator walker at baseline. Pt is on ASA.   She denies fever/chills, cpain/SOB,  abd, UTI or URI symptoms.  She has chronic urinary incontinence.    No other complaints.  She has poor po intake today,  per daughter-in-law at bedside.     UA is positive and pt has leukocytosis      PHYSICAL EXAM:    Daily Height in cm: 162.56 (07 May 2018 16:46)    Daily     ICU Vital Signs Last 24 Hrs  T(C): 36.6 (07 May 2018 21:00), Max: 36.7 (07 May 2018 17:38)  T(F): 97.8 (07 May 2018 21:00), Max: 98 (07 May 2018 17:38)  HR: 75 (08 May 2018 08:00) (72 - 76)  BP: 150/65 (08 May 2018 08:00) (148/60 - 155/54)  BP(mean): --  ABP: --  ABP(mean): --  RR: 16 (08 May 2018 08:00) (16 - 16)  SpO2: 100% (08 May 2018 08:00) (99% - 100%)      ConstitutionalNAD  HEENT: Atraumatic, SHIRAZ, Normal, No congestion  Respiratory: Breath Sounds normal, no rhonchi/wheeze  Cardiovascular: N S1S2;   Gastrointestinal: Abdomen soft, non tender, Bowel Ssounds present  Extremities: No edema, peripheral pulses present  Neurological: AAO x 3, no gross focal motor deficits      Back: No CVA tenderness   Musculoskeletal: non tender  Breasts: Deferred  Genitourinary: deferred  Rectal: Deferred                          12.5   13.66 )-----------( 287      ( 08 May 2018 07:37 )             38.8       CBC Full  -  ( 08 May 2018 07:37 )  WBC Count : 13.66 K/uL  Hemoglobin : 12.5 g/dL  Hematocrit : 38.8 %  Platelet Count - Automated : 287 K/uL  Mean Cell Volume : 82.6 fl  Mean Cell Hemoglobin : 26.6 pg  Mean Cell Hemoglobin Concentration : 32.2 gm/dL  Auto Neutrophil # : 12.06 K/uL  Auto Lymphocyte # : 0.89 K/uL  Auto Monocyte # : 0.57 K/uL  Auto Eosinophil # : 0.03 K/uL  Auto Basophil # : 0.04 K/uL  Auto Neutrophil % : 88.3 %  Auto Lymphocyte % : 6.5 %  Auto Monocyte % : 4.2 %  Auto Eosinophil % : 0.2 %  Auto Basophil % : 0.3 %          140  |  109<H>  |  48<H>  ----------------------------<  93  4.4   |  23  |  1.56<H>    Ca    9.1      08 May 2018 07:37    TPro  7.7  /  Alb  3.4  /  TBili  0.5  /  DBili  x   /  AST  30  /  ALT  12  /  AlkPhos  77  05-07      LIVER FUNCTIONS - ( 07 May 2018 17:24 )  Alb: 3.4 g/dL / Pro: 7.7 gm/dL / ALK PHOS: 77 U/L / ALT: 12 U/L / AST: 30 U/L / GGT: x                       Urinalysis Basic - ( 07 May 2018 18:08 )    Color: Yellow / Appearance: Clear / S.010 / pH: x  Gluc: x / Ketone: Negative  / Bili: Negative / Urobili: Negative mg/dL   Blood: x / Protein: 100 mg/dL / Nitrite: Positive   Leuk Esterase: Moderate / RBC: 3-5 /HPF / WBC 11-25   Sq Epi: x / Non Sq Epi: Occasional / Bacteria: Many            MEDICATIONS  (STANDING):  aspirin  chewable 81 milliGRAM(s) Oral daily  cefTRIAXone Injectable. 1000 milliGRAM(s) IV Push every 24 hours  diltiazem    milliGRAM(s) Oral daily  docusate sodium 100 milliGRAM(s) Oral daily  heparin  Injectable 5000 Unit(s) SubCutaneous every 8 hours  multivitamin 1 Tablet(s) Oral daily  pantoprazole    Tablet 40 milliGRAM(s) Oral before breakfast  senna 1 Tablet(s) Oral at bedtime
Reason for Admission: fall	  History of Present Illness: 	  Pt is a  89 y/o woman with a PMHx of Diastolic CHF, unspecified type, HTN presents to the ED from [facility] assisted [facility] s/p mechanical fall today. Pt states she was in her bathroom having a BM  and slipped off and fell.   She was then unable to get up due to b/l LE weakness, pain. No LOC. Unsure about head trauma. Pt reports left knee pain, right hip pain, weakness after the fall. Pt states she has ecchymosis below the right knee from fall. No fever or any other acute complaints at this time. Pt is ambulatory with a rollator walker at baseline. Pt is on ASA.   She denies fever/chills, cpain/SOB,  abd, UTI or URI symptoms.  She has chronic urinary incontinence.    No other complaints.  She has poor po intake today,  per daughter-in-law at bedside. UA is positive and pt has leukocytosisUA is positive and pt has leukocytosisUA is positive and pt has leukocytosis  PHYSICAL EXAM:  )      ConstitutionalNAD  HEENT: Atraumatic, SHIRAZ, Normal, No congestion  Respiratory: Breath Sounds normal, no rhonchi/wheeze  Cardiovascular: N S1S2;   Gastrointestinal: Abdomen soft, non tender, Bowel Ssounds present  Extremities: No edema, peripheral pulses present  Neurological: awake ,alert, left facial droop, left arm weakness and left leg flexion   Back: No CVA tenderness   Musculoskeletal: non tender  Breasts: DeferredGenitourinary: deferred  Rectal: Deferred                          12.5   13.66 )-----------( 287      ( 08 May 2018 07:37 )             38.8       CBC Full  -  ( 08 May 2018 07:37 )  WBC Count : 13.66 K/uL  Hemoglobin : 12.5 g/dL  Hematocrit : 38.8 %  Platelet Count - Automated : 287 K/uL  Mean Cell Volume : 82.6 fl  Mean Cell Hemoglobin : 26.6 pg  Mean Cell Hemoglobin Concentration : 32.2 gm/dL  Auto Neutrophil # : 12.06 K/uL  Auto Lymphocyte # : 0.89 K/uL  Auto Monocyte # : 0.57 K/uL  Auto Eosinophil # : 0.03 K/uL  Auto Basophil # : 0.04 K/uL  Auto Neutrophil % : 88.3 %  Auto Lymphocyte % : 6.5 %  Auto Monocyte % : 4.2 %  Auto Eosinophil % : 0.2 %  Auto Basophil % : 0.3 %05-08    140  |  109<H>  |  48<H>  ----------------------------<  93  4.4   |  23  |  1.56<H>    Ca    9.1      08 May 2018 07:37    TPro  7.7  /  Alb  3.4  /  TBili  0.5  /  DBili  x   /  AST  30  /  ALT  12  /  AlkPhos  77  05-07      LIVER FUNCTIONS - ( 07 May 2018 17:24 )  Alb: 3.4 g/dL / Pro: 7.7 gm/dL / ALK PHOS: 77 U/L / ALT: 12 U/L / AST: 30 U/L / GGT: x
· Reason for Referral/Consultation:	Called this AM to see patient for possible CVA	      · Subjective and Objective: 	    CHIEF COMPLAINT: Patient is a 89y old  Female who presents with a chief complaint of fall (07 May 2018 20:36)    HPI:  89 y/o woman with a history of diastolic HF (chronic), pericardial effusion, essential hypertension presented to the ED on 5/7 following a fall at her assisted living facility.  She was admitted to the medical service for the management of a UTI and renal insufficiency.  On examination today she was noted to have left-sided weakness and the diagnosis of a subacute stroke is being considered; cardiology consult called to evaluate for potential cardiac etiology and to assist with telemetry monitoring.  Patient described weakness of her left side - says it started yesterday; unable to identify exacerbating / alleviating factors; no associated pain, paresthesias, or difficulty with speech.  She says that she "just want to go home and go to sleep."       05/10/18 - lethargic but easily arousable ( received Ultram last night). Denies chest pain or SOB.     PAST MEDICAL & SURGICAL HISTORY:  CHF (congestive heart failure)  Essential hypertension  No significant past surgical history      Allergies    ibuprofen (Unknown)  Originally Entered as [Unknown] reaction to [sulfur] (Unknown)  sulfa drugs (Unknown)        MEDICATIONS:    MEDICATIONS  (STANDING):  aspirin  chewable 81 milliGRAM(s) Oral daily  atorvastatin 20 milliGRAM(s) Oral at bedtime  cefTRIAXone Injectable. 1000 milliGRAM(s) IV Push every 24 hours  diltiazem    milliGRAM(s) Oral daily  docusate sodium 100 milliGRAM(s) Oral daily  heparin  Injectable 5000 Unit(s) SubCutaneous every 8 hours  multivitamin 1 Tablet(s) Oral daily  pantoprazole    Tablet 40 milliGRAM(s) Oral before breakfast  senna 1 Tablet(s) Oral at bedtime    MEDICATIONS  (PRN):  acetaminophen   Tablet. 650 milliGRAM(s) Oral every 6 hours PRN Mild Pain (1 - 3)  traMADol 25 milliGRAM(s) Oral every 8 hours PRN Severe Pain (7 - 10)        Vital Signs Last 24 Hrs  T(C): 36.6 (10 May 2018 04:40), Max: 36.6 (09 May 2018 17:04)  T(F): 97.9 (10 May 2018 04:40), Max: 97.9 (10 May 2018 04:40)  HR: 83 (10 May 2018 04:40) (68 - 83)  BP: 146/53 (10 May 2018 04:40) (138/84 - 157/52)  BP(mean): 78 (10 May 2018 04:40) (78 - 78)  RR: 18 (09 May 2018 17:04) (17 - 18)  SpO2: 97% (10 May 2018 04:40) (95% - 97%)        PHYSICAL EXAM:    Constitutional: NAD, lethargic but easily arousable.   HEENT: NC,. No oral cyanosis. .  Respiratory: Breath sounds are clear bilaterally, No wheezing, rales or rhonchi  Cardiovascular: S1 and S2, regular  rhythm.  Gastrointestinal: Bowel Sounds present, soft, nontender.   Extremities: No peripheral edema. No clubbing or cyanosis.  Neurological: lethargic but easily arousable.   Musculoskeletal: no calf tenderness.  Skin: No rashes.      LABS: All Labs Reviewed:                        11.7   12.12 )-----------( 271      ( 10 May 2018 06:35 )             37.4                         12.3   10.32 )-----------( 271      ( 09 May 2018 06:51 )             38.5                         12.5   13.66 )-----------( 287      ( 08 May 2018 07:37 )             38.8     10 May 2018 06:35    144    |  113    |  39     ----------------------------<  102    4.6     |  22     |  1.58   09 May 2018 06:51    143    |  111    |  42     ----------------------------<  105    4.0     |  20     |  1.38   08 May 2018 07:37    140    |  109    |  48     ----------------------------<  93     4.4     |  23     |  1.56     Ca    8.9        10 May 2018 06:35  Ca    8.9        09 May 2018 06:51  Ca    9.1        08 May 2018 07:37    TPro  7.7    /  Alb  3.4    /  TBili  0.5    /  DBili  x      /  AST  30     /  ALT  12     /  AlkPhos  77     07 May 2018 17:24      RADIOLOGY/EKG:    IMPRESSION: Chest: No acute disease.  Pelvis/right hip: No fracture. Status post right hip and femur open   reduction/internal fixation  Bilateral knees: No fracture. Moderate right and severe left knee   degenerative changes.        Ventricular Rate 81 BPM    Atrial Rate 81 BPM    P-R Interval 174 ms    QRS Duration 72 ms    Q-T Interval 422 ms    QTC Calculation(Bezet) 490 ms    P Axis 65 degrees    R Axis 20 degrees    T Axis 70 degrees    Diagnosis Line Normal sinus rhythm  Prolonged QT  Abnormal ECG    Confirmed by KEITH WASHINGTON MD ([id number]) on 5/8/2018 8:40:10 AM       Summary - echocardiogram -      Only limited echocardiography views were obtained.   Left ventricle systolic function is preserved.   Estimated left ventricular ejection fraction is 65-70%.   Moderate mitral annular calcification is present.   A moderate circumferential pericardial effusion is present .   Appears similar to prior study dated 01/06/2017   Dr. Santoro aware of study findings.   Pleural effusion - is present..     Signature     ----------------------------------------------------------------   Electronically signed by Norberto Nichols MD(Interpreting   physician) on 03/24/2017 06:17 PM   ----------------------------------------------------------------    COMPARISON: CT head 5/7/2018    FINDINGS:     The ventricles and sulci are prominent, compatible with age-related   generalized cerebral volume loss.   There is no CT evidence for acute   cerebral cortical infarct. There is no evidence of hemorrhage. There is   periventricular and subcortical white matter hypoattenuation,  most   compatible with mild chronic microvascular ischemic changes.   No mass   effect is found in the brain.  There is no midline shift or herniation   pattern.      The visualized portions of the paranasal sinuses and mastoid air cells   are clear.      IMPRESSION:       No evidence of acute intracranial abnormality.  No evidence of   hemorrhage.      Age-related involutional changes as above.      HENRI NUNEZ M.D., ATTENDING RADIOLOGIST  This document has been electronically signed. May  9 2018  1:19PM      telemetry -  sinus with occasional apc. No a. fib.

## 2018-05-13 NOTE — PROGRESS NOTE ADULT - ASSESSMENT
Pt is a  89 y/o woman with a PMHx of Diastolic CHF, unspecified type, HTN presents to the ED from [facility] assisted [facility] s/p mechanical fall. Pt states she was in her bathroom having a BM  and slipped off and fell.   She was then unable to get up due to b/l LE weakness, pain. No LOC.  Pt reported left knee pain, right hip pain, weakness after the fall. Pt states she has ecchymosis below the right knee from fall.  Pt is ambulatory with a rollator walker at baseline. Pt is on ASA.      Admitted with mechanical fall and generalized weakness and found to have ESBL UTI and Acute CVA.    # UTI with MDRO/ESBL UTI  - Initially got 3 days rocephin, now switched to Meropenem # 3/5.  Will plan for Ertapenem 500mg once daily to complete course via PIV upon discharge to [facility].  - blood cx negx2  - blood cx were drawn after antibiotics in ER  - ID consult appreciated    # Acute hypoxic resp failure- likely 2/2 pulmonary vascular congestion.  Less likly aspiration pneumonia.- Improved.  - diuresis. restarting daily torsemide.  - aspiration precautions  - continue meropenem until discharge    # L Knee contraction- since admission. Per family did not have prior to fall.   - Ortho re-eval noted.  No indication for immobilization or splint.  - Will obtain MR knee to eval for any tendon injury.    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  - will continue to hold ramipril.  will give cardizem for now.  - c/w home dose torsemide  - Creat to be monitored at rehab.  Pts own primary to follow there.    # left sided weakness time of onset unknown 2/2 acute ischemic stroke  - head Ct at time of admission neg  - MRI/A c/w acute cva likely 2/2 either embolic etio or cerebrovascular atherosclerotic disease  - Pt poor candidate for AC d/t high risk of falls/bleeding  - continue aspirin and statin  - cw tele    # CHF,diastolic chronic  - stable. c/w torsemide. continue holding ramipril d/t YUNIOR.    # Htn  - cardizem, torsemide  - avoid hypotension    Dispo: MRI then dc to rehab. result to be followed after discharge and pt to f/u with her doctors at [facility].  Plan was coordinated with pts daughter in law who is a rheumatologist at [facility].    time spent on discharge 35 minutes

## 2018-05-13 NOTE — PROGRESS NOTE ADULT - PROVIDER SPECIALTY LIST ADULT
Cardiology
Cardiology
Hospitalist
Orthopedics
Hospitalist

## 2018-05-17 DIAGNOSIS — Z88.2 ALLERGY STATUS TO SULFONAMIDES: ICD-10-CM

## 2018-05-17 DIAGNOSIS — N18.2 CHRONIC KIDNEY DISEASE, STAGE 2 (MILD): ICD-10-CM

## 2018-05-17 DIAGNOSIS — I63.9 CEREBRAL INFARCTION, UNSPECIFIED: ICD-10-CM

## 2018-05-17 DIAGNOSIS — R32 UNSPECIFIED URINARY INCONTINENCE: ICD-10-CM

## 2018-05-17 DIAGNOSIS — M24.562 CONTRACTURE, LEFT KNEE: ICD-10-CM

## 2018-05-17 DIAGNOSIS — Z88.8 ALLERGY STATUS TO OTHER DRUGS, MEDICAMENTS AND BIOLOGICAL SUBSTANCES: ICD-10-CM

## 2018-05-17 DIAGNOSIS — I50.32 CHRONIC DIASTOLIC (CONGESTIVE) HEART FAILURE: ICD-10-CM
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
## 2018-05-17 DIAGNOSIS — G81.94 HEMIPLEGIA, UNSPECIFIED AFFECTING LEFT NONDOMINANT SIDE: ICD-10-CM

## 2018-05-17 DIAGNOSIS — Z16.24 RESISTANCE TO MULTIPLE ANTIBIOTICS: ICD-10-CM

## 2018-05-17 DIAGNOSIS — M17.12 UNILATERAL PRIMARY OSTEOARTHRITIS, LEFT KNEE: ICD-10-CM

## 2018-05-17 DIAGNOSIS — J96.01 ACUTE RESPIRATORY FAILURE WITH HYPOXIA: ICD-10-CM

## 2018-05-17 DIAGNOSIS — N17.9 ACUTE KIDNEY FAILURE, UNSPECIFIED: ICD-10-CM

## 2018-05-17 DIAGNOSIS — M15.9 POLYOSTEOARTHRITIS, UNSPECIFIED: ICD-10-CM

## 2018-05-17 DIAGNOSIS — N39.0 URINARY TRACT INFECTION, SITE NOT SPECIFIED: ICD-10-CM

## 2018-05-17 DIAGNOSIS — I31.3 PERICARDIAL EFFUSION (NONINFLAMMATORY): ICD-10-CM

## 2018-05-17 DIAGNOSIS — L27.0 GENERALIZED SKIN ERUPTION DUE TO DRUGS AND MEDICAMENTS TAKEN INTERNALLY: ICD-10-CM
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
## 2018-05-17 DIAGNOSIS — T36.1X5A ADVERSE EFFECT OF CEPHALOSPORINS AND OTHER BETA-LACTAM ANTIBIOTICS, INITIAL ENCOUNTER: ICD-10-CM

## 2018-05-17 DIAGNOSIS — E44.0 MODERATE PROTEIN-CALORIE MALNUTRITION: ICD-10-CM

## 2018-05-17 DIAGNOSIS — I13.0 HYPERTENSIVE HEART AND CHRONIC KIDNEY DISEASE WITH HEART FAILURE AND STAGE 1 THROUGH STAGE 4 CHRONIC KIDNEY DISEASE, OR UNSPECIFIED CHRONIC KIDNEY DISEASE: ICD-10-CM

## 2018-05-17 DIAGNOSIS — B96.20 UNSPECIFIED ESCHERICHIA COLI [E. COLI] AS THE CAUSE OF DISEASES CLASSIFIED ELSEWHERE: ICD-10-CM

## 2019-01-31 NOTE — ED ADULT NURSE NOTE - CARDIO ASSESSMENT
WDL Double O-Z Plasty Text: The defect edges were debeveled with a #15 scalpel blade.  Given the location of the defect, shape of the defect and the proximity to free margins a Double O-Z plasty (double transposition flap) was deemed most appropriate.  Using a sterile surgical marker, the appropriate transposition flaps were drawn incorporating the defect and placing the expected incisions within the relaxed skin tension lines where possible. The area thus outlined was incised deep to adipose tissue with a #15 scalpel blade.  The skin margins were undermined to an appropriate distance in all directions utilizing iris scissors.  Hemostasis was achieved with electrocautery.  The flaps were then transposed into place, one clockwise and the other counterclockwise, and anchored with interrupted buried subcutaneous sutures.

## 2022-05-21 NOTE — ED ADULT NURSE NOTE - DURATION
Patient is a 77 y/o female, from home, baseline  and ambulatory w/ wheelchair, with significant medical history of Sarcoidosis, and Hypothyroidism who was BIBEMS after she had lost consciousness in a sitting position and slipped from her wheelchair. Admitted for syncope workup.       In the ED:  VS: Afebrile, HR 69bpm, /54mmHg, RR 17, Spo2 100% on RA   Labs significant for WBC 12.46, K 2.8, trop 15.1  EKG unchanged from prior  CTH negative    yesterday

## 2022-10-07 NOTE — PROGRESS NOTE ADULT - PROVIDER SPECIALTY LIST ADULT
Hospitalist Patient's HR around 140-106  ECG given to provider and provider is at bedside       Arslan Do RN  10/07/22 [id number]

## 2023-09-17 NOTE — PROGRESS NOTE ADULT - PROBLEM/PLAN-2
pt sitting up and is prepared for PT session,    pt is motivated to improve and with no c/o pain or other issues.       pt had earlier am meds and reports no changes to meds.    pt has been attempting hep review but with limitations       pt was limited today but very compliant and tolerant of the activities performed.    pt was minimally unsteady upon standing but not significant.     pt was encouraged to ambulate each hour and was cued to properly flex the hips and to heel strike as able.  pt was educated on proper form with hep review with cues needed to properly contract/hold and to properly deep plb         plan for next session-   gait trg, hep review, balance trg, and transfer education as needed
DISPLAY PLAN FREE TEXT

## 2025-05-20 NOTE — ED PROVIDER NOTE - CARE PLAN
Thank you for letting us care for you at your recent visit to [facility]. We would love to hear about your experience with us. Was this the first time you have visited our location?    We’re always looking for ways to make our patients’ experience even better. Do you have any recommendations on ways we may improve?     Please be on the lookout for a survey about your recent visit from [facility] via text or email. We would greatly appreciate if you could fill that out and turn it back in. We want your voice to be heard and we value your feedback.     Thank you for choosing [facility] for your healthcare needs. I appreciate you taking the time to respond!   Principal Discharge DX:	Congestive heart failure  Secondary Diagnosis:	ACS (acute coronary syndrome)  Secondary Diagnosis:	HTN (hypertension)